# Patient Record
Sex: MALE | Race: WHITE | NOT HISPANIC OR LATINO | Employment: UNEMPLOYED | URBAN - METROPOLITAN AREA
[De-identification: names, ages, dates, MRNs, and addresses within clinical notes are randomized per-mention and may not be internally consistent; named-entity substitution may affect disease eponyms.]

---

## 2018-01-09 NOTE — MISCELLANEOUS
Message   Recorded as Task   Date: 08/05/2016 09:46 AM, Created By: KELLY [facility]   Task Name: Follow Up   Assigned To: [facility] [place] [facility]   Regarding Patient: Gucci Shelby, Status: In Progress   Comment:    Robert Long - 05 Aug 2016 9:46 AM     TASK CREATED  Caller: Self; General Medical Question; [phone] (Home)  Message left w/ ans svc 8/4/2016 @ 1810  "Please call regarding rx at pharmacy needs auth to be refilled "   Robert Long - 05 Aug 2016 10:49 AM     TASK EDITED  LMOM to cb   Robert Long - 05 Aug 2016 12:00 PM     TASK EDITED  S/w pt, confirmed nucynta ER is requiring auth to be filled  Advised pt per DG, the medication on hand at the time of discharge was to be used to decrease and stop - Nucynta ER 1 pill po qd x 7 days then stop  Unfilled rx's will not be authorized  Pt argued that wasn't enough medication - advised that 7 pills were available  Pt stated that he will be reporting the office somehow  Asked the pt if there were any other questions or concerns  pt stated, "Bitch"  Call ended  Robert Long - 05 Aug 2016 12:05 PM     TASK EDITED  s/w [facility] [phone]  Advised pharmacist to destroy rx for nucynta  mg per DG  Pharmacist verbalized understnding and appreciation  DG aware  Active Problems    1  Cervical radiculopathy (723 4) (M54 12)   2  Chronic cervical pain (723 1,338 29) (M54 2,G89 29)   3  Difficulty sleeping (780 50) (G47 9)   4  Encounter for long-term opiate analgesic use (V58 69) (Z79 891)   5  Herniated nucleus pulposus, C4-5 (722 0) (M50 22)   6  Herniated nucleus pulposus, C6-7 left (722 0) (M50 22)   7  Hypertension (401 9) (I10)   8  Opioid dependence, continuous (304 01) (F11 20)   9  Other spondylosis, cervical region (721 0) (M47 892)   10  Pain syndrome, chronic (338 4) (G89 4)   11  Shoulder bursitis (726 10) (M75 50)   12  Stenosis, cervical spine (723 0) (M48 02)   13   Tendinitis of right shoulder (726 10) (M75 81)    Current Meds   1  Albuterol 90 MCG/ACT AERS; Therapy: (Recorded:04Mar2016) to Recorded   2  Gabapentin 300 MG Oral Capsule; Take 2 PO HS x 2 weeks, then 3 PO HS; Therapy: 54KZL8436 to (Evaluate:83Gbq0380)  Requested for: 21Jul2016; Last   Rx:76Mgm1372 Ordered   3  Nucynta  MG Oral Tablet Extended Release 12 Hour; Take 1 Pill Daily x 7 days,   then STOP; Therapy: 91EVD7823 to (Evaluate:10Aug2016); Last Rx:00Sse4453 Ordered   4  OxyCODONE HCl - 10 MG Oral Tablet; Take 1 pill BID X 5 days, then 1 pill QD x 5   days, then 1 pill every other day x 4 days, then STOP; Therapy: 47XFD9130 to (Evaluate:10Aug2016); Last Rx:73Lvz8411 Ordered   5  Symbicort AERO; Therapy: (Recorded:04Mar2016) to Recorded   6  Zolpidem Tartrate 10 MG Oral Tablet; TAKE 1 TABLET AT BEDTIME AS NEEDED   Recorded    Allergies    1  No Known Drug Allergies    2   Latex    Signatures   Electronically signed by : Danial Castillo, ; Aug  5 2016 12:05PM EST                       (Author)

## 2018-01-10 NOTE — MISCELLANEOUS
Message   Recorded as Task   Date: 05/24/2016 11:09 AM, Created By: Kathryn Dickerson   Task Name: Miscellaneous   Assigned To: [facility] [place] [facility]   Regarding Patient: Estela Contreras, Status: In Progress   CommentMariana Prim - 24 May 2016 11:09 AM     TASK CREATED  Patient left a message on the answering machine that he is finding it is difficult to work  He was wondering if you would fill out short term disability papers for him  He would like a call back at [phone]  Thank you  Mitesh Harper - 24 May 2016 11:12 AM     TASK REPLIED TO: Previously Assigned To [facility] [place] [facility]  Please advise him that I do not determine disability and that he would need to f/u with his PCP  We will give our notes or documentation  The only other option would be for him to see Dr Starr Drummond for his work restrictions and determine if he can or can not work  Let me know if he wants to see Dr Starr Drummond for that assessment  Robert Long - 24 May 2016 11:28 AM     TASK EDITED  *** FYI ***   S/w pt, advised of kong  pt verbalized understanding  States, if this office receives any paperwork from [employer], please discard  Mitesh Harper - 24 May 2016 11:36 AM     TASK REPLIED TO: Previously Assigned To Mitesh Harper  Provider aware  Thank you  Active Problems    1  Cervical radiculopathy (723 4) (M54 12)   2  Chronic cervical pain (723 1,338 29) (M54 2,G89 29)   3  Difficulty sleeping (780 50) (G47 9)   4  Encounter for long-term opiate analgesic use (V58 69) (Z79 891)   5  Herniated nucleus pulposus, C4-5 (722 0) (M50 22)   6  Herniated nucleus pulposus, C6-7 left (722 0) (M50 22)   7  Hypertension (401 9) (I10)   8  Opioid dependence, continuous (304 01) (F11 20)   9  Other spondylosis, cervical region (721 0) (M47 892)   10  Pain syndrome, chronic (338 4) (G89 4)   11  Shoulder bursitis (726 10) (M75 50)   12  Stenosis, cervical spine (723 0) (M48 02)   13   Tendinitis of right shoulder (725 10) (O21 81)    Current Meds   1  Albuterol 90 MCG/ACT AERS; Therapy: (Recorded:04Mar2016) to Recorded   2  Gabapentin 300 MG Oral Capsule; Take 2 PO HS x 2 weeks, then 3 PO HS; Therapy: 32PCO2621 to (Evaluate:22Jul2016)  Requested for: 76IHZ1613; Last   Rx:23May2016 Ordered   3  Nucynta  MG Oral Tablet Extended Release 12 Hour; Take 1 tablet twice daily; Therapy: 74UPW4219 to (Evaluate:22Jun2016); Last Rx:23May2016 Ordered   4  OxyCODONE HCl - 10 MG Oral Tablet; TAKE 1 TABLET EVERY 6 HOURS AS NEEDED; Therapy: 73HFT5015 to (Evaluate:22Jun2016); Last Rx:23May2016 Ordered   5  Symbicort AERO; Therapy: (Recorded:04Mar2016) to Recorded   6  Zolpidem Tartrate 10 MG Oral Tablet; TAKE 1 TABLET AT BEDTIME AS NEEDED   Recorded    Allergies    1  No Known Drug Allergies    2   Latex    Signatures   Electronically signed by : Theodora Dandy, ; May 24 2016 12:49PM EST                       (Author)

## 2018-01-11 NOTE — MISCELLANEOUS
Message   Recorded as Task   Date: 02/02/2016 10:55 AM, Created By: Adelia Rankin   Task Name: Miscellaneous   Assigned To: Adelia Rankin   Regarding Patient: Grey Corral, Status: Active   CommentBeatrice Duverney - 02 Feb 2016 10:55 AM     TASK CREATED  Patient missed appt  2/2/16 @ 10:45 AM with Dr Stella Jin  I called the patient and left a message on his answering machine to call the office to reschedule this appt  I put a sticky note in IDX to schedule the patient with Mitesh if he calls to reschedule  Lebron Mcgill - 02 Feb 2016 11:02 AM     TASK REPLIED TO: Previously Assigned To Lebron Mcgill  aware-please let pcp know     Signatures   Electronically signed by :  Berna Guevara, ; Feb 2 2016 11:55AM EST                       (Author)    Electronically signed by : David Hudson DO; Feb 2 2016 12:15PM EST

## 2018-01-12 NOTE — MISCELLANEOUS
Message   Recorded as Task   Date: 07/21/2016 09:13 AM, Created By: Marcin Robison   Task Name: Med Renewal Request   Assigned To: [facility] [place] [facility]   Regarding Patient: Dana Hein, Status: In Progress   Comment:    Robert Long - 21 Jul 2016 9:13 AM     TASK CREATED  Caller: Self; Renew Medication; [phone] (Home)  S/w pt, states he will need refills before his ov in august  Per pt, Gabapentin 300 mg 1 tab po tid, nucynta er bid and oxycodone QD  Pt w/ difficulty remembering his medication - hydrocodone vs oxycodone  "Percocet is the one w/ oxycodone in it, right?"  Pt states he will run out of medication today / tomorrow  Requesting rx's until his august ov  States, "then I'll give a urine and it'll be clean "  Advised pt - controlled substances require an ov  Will d/w DG and Mitesh Mitchell - 21 Jul 2016 9:58 AM     TASK REPLIED TO: Previously Assigned To [facility] [place] [facility]  He will need to come in for an OV today or tomorrow to provide a UDS and the point of care results would need to be appropriate before I can provide any scripts  Robert Long - 21 Jul 2016 10:05 AM     TASK EDITED  LMOM to cb   Robert Long - 21 Jul 2016 1:56 PM     TASK EDITED  [facility] 7/21/2016 @ 1307 -   Pt states he will be home for 60-90 min  S/w pt, advised of above  pt verbalized understanding  States he will be in today before 4  DG aware        Active Problems    1  Cervical radiculopathy (723 4) (M54 12)   2  Chronic cervical pain (723 1,338 29) (M54 2,G89 29)   3  Difficulty sleeping (780 50) (G47 9)   4  Encounter for long-term opiate analgesic use (V58 69) (Z79 891)   5  Herniated nucleus pulposus, C4-5 (722 0) (M50 22)   6  Herniated nucleus pulposus, C6-7 left (722 0) (M50 22)   7  Hypertension (401 9) (I10)   8  Opioid dependence, continuous (304 01) (F11 20)   9  Other spondylosis, cervical region (721 0) (M47 892)   10  Pain syndrome, chronic (338 4) (G89 4)   11   Shoulder bursitis (726 10) (M75 50)   12  Stenosis, cervical spine (723 0) (M48 02)   13  Tendinitis of right shoulder (726 10) (M75 81)    Current Meds   1  Albuterol 90 MCG/ACT AERS; Therapy: (Recorded:04Mar2016) to Recorded   2  Gabapentin 300 MG Oral Capsule; Take 2 PO HS x 2 weeks, then 3 PO HS; Therapy: 76WOY7308 to (Evaluate:22Jul2016)  Requested for: 31GAL9941; Last   Rx:23May2016 Ordered   3  Nucynta  MG Oral Tablet Extended Release 12 Hour; Take 1 tablet twice daily; Therapy: 66JZF2860 to (Evaluate:22Jun2016); Last Rx:23May2016 Ordered   4  OxyCODONE HCl - 10 MG Oral Tablet; TAKE 1 TABLET EVERY 6 HOURS AS NEEDED; Therapy: 62GLV3440 to (Evaluate:22Jun2016); Last Rx:23May2016 Ordered   5  Symbicort AERO; Therapy: (Recorded:04Mar2016) to Recorded   6  Zolpidem Tartrate 10 MG Oral Tablet; TAKE 1 TABLET AT BEDTIME AS NEEDED   Recorded    Allergies    1  No Known Drug Allergies    2   Latex    Signatures   Electronically signed by : Eloisa Ahuja, ; Jul 21 2016  1:58PM EST                       (Author)

## 2018-01-13 NOTE — MISCELLANEOUS
Message   Recorded as Task   Date: 07/29/2016 06:33 AM, Created By: [employer]   Task Name: Follow Up   Assigned To: Chungruben Nietoill   Regarding Patient: Dom Ledezma, Status: Active   CommentRenee Bernard - 29 Jul 2016 6:33 AM     TASK CREATED  Can you please cancel his upcoming OV and put a note in IDX that he is discharged from our practice  Thank you  Elvis Cat - 01 Aug 2016 8:54 AM     TASK EDITED  NOTE POSTED AND APPT CXED  Active Problems    1  Cervical radiculopathy (723 4) (M54 12)   2  Chronic cervical pain (723 1,338 29) (M54 2,G89 29)   3  Difficulty sleeping (780 50) (G47 9)   4  Encounter for long-term opiate analgesic use (V58 69) (Z79 891)   5  Herniated nucleus pulposus, C4-5 (722 0) (M50 22)   6  Herniated nucleus pulposus, C6-7 left (722 0) (M50 22)   7  Hypertension (401 9) (I10)   8  Opioid dependence, continuous (304 01) (F11 20)   9  Other spondylosis, cervical region (721 0) (M47 892)   10  Pain syndrome, chronic (338 4) (G89 4)   11  Shoulder bursitis (726 10) (M75 50)   12  Stenosis, cervical spine (723 0) (M48 02)   13  Tendinitis of right shoulder (726 10) (M75 81)    Current Meds   1  Albuterol 90 MCG/ACT AERS; Therapy: (Recorded:04Mar2016) to Recorded   2  Gabapentin 300 MG Oral Capsule; Take 2 PO HS x 2 weeks, then 3 PO HS; Therapy: 61ZLE1028 to (Evaluate:73Hca6065)  Requested for: 21Jul2016; Last   Rx:82Gxi2168 Ordered   3  Nucynta  MG Oral Tablet Extended Release 12 Hour; Take 1 Pill Daily x 7 days,   then STOP; Therapy: 89ZXP1290 to (Evaluate:10Aug2016); Last Rx:36Uxo9045 Ordered   4  OxyCODONE HCl - 10 MG Oral Tablet; Take 1 pill BID X 5 days, then 1 pill QD x 5   days, then 1 pill every other day x 4 days, then STOP; Therapy: 82KLW9530 to (Evaluate:10Aug2016); Last Rx:74Gqz3303 Ordered   5  Symbicort AERO; Therapy: (Recorded:04Mar2016) to Recorded   6   Zolpidem Tartrate 10 MG Oral Tablet; TAKE 1 TABLET AT BEDTIME AS NEEDED Recorded    Allergies    1  No Known Drug Allergies    2  Latex    Signatures   Electronically signed by :  Snow Castañeda, ; Aug  1 2016  8:54AM EST                       (Author)

## 2018-01-13 NOTE — RESULT NOTES
Message   Recorded as Task   Date: 07/26/2016 12:28 PM, Created By: Yudy Higgins   Task Name: Follow Up   Assigned To: [facility] [place] [facility]   Regarding Patient: Radha Madera, Status: In Progress   Comment:    Mitesh Harper - 26 Jul 2016 12:28 PM     TASK CREATED  Can you please call the patient and advise him that his UDS results came back and confirmed that his UDS was positive for Methadone, but negative for Oxycodone, which he reported taking and is prescribed  The Methadone is not prescribed to him  Can he explain? Robert Long - 26 Jul 2016 3:23 PM     TASK EDITED  LMOM to cb on home #   MercedesRobert - 26 Jul 2016 3:24 PM     TASK EDITED  [facility] to cb on cell   MercedesManfarrukh - 26 Jul 2016 3:34 PM     TASK EDITED  s/w pt, advised of above  Pt states he was at his mom's house anywhere from 4 -10 days ago  "Maybe the 18th? 19th?"  Pt states he did not have his medciations with him because he keeps them locked  Pt states he took 2 of his brother's pills  Pt states his brother told him they were oxycodone, but his brother does have a h/o heroin use, "so maybe they were methadone "  Pt states he cannot explain why there was no oxycodone in his system  States his last dose was on the 19th  Advised pt, will d/w DG and cb  Pt verbalized understanding  Mitesh Harper - 26 Jul 2016 4:32 PM     TASK REPLIED TO: Previously Assigned To Mitesh Harper  The issues is that I flat out asked him when he was in the office regarding the Methadone  He did not discuss taking someone else's medication at that point  This is a direct violation of the opioid contract and we have no choice but to discharge him from the practice  He is to decrease the Nucynta ER to 1 PO QD x 7 days, then STOP  He is to decrease the Oxycodone 10 mg to 1 PO BID x 5 days, then 1 PO QD x 5 days, then 1 PO QOD x 4 days, then STOP  We will cancel his upcoming OV and send an official discharge letter     Robert Long - 27 Jul 2016 10:44 AM     TASK EDITED  LMOM to cb on home and cell   Robert Long - 29 Jul 2016 1:31 PM     TASK EDITED  *** FYI ***  S/w pt, advised of above  Pt states he did not mention the methadone at his uds because he forgot  Pt states his wife reminded him during previous telephone call  Restated above  Pt verbalized understanding   Mitesh Harper - 29 Jul 2016 1:34 PM     TASK REPLIED TO: Previously Assigned To Ashok Noyola  Provider aware  Thank you          Signatures   Electronically signed by : You Apodaca, ; Jul 29 2016  2:26PM EST                       (Author)

## 2018-01-15 NOTE — RESULT NOTES
Message   Recorded as Task   Date: 02/22/2016 08:34 AM, Created By: Johnathon Lee   Task Name: Follow Up   Assigned To: [facility] [facility]   Regarding Patient: Leslee Paula, Status: Active   Comment:    Trish Healy - 22 Feb 2016 8:34 AM     TASK CREATED  Pt  s/p NICOLE #1 on 2/18/16 w/ Dr Sheela Goode #2 scheduled on 3/3/16 at 145 pm w/ Dr Angelina Bernabe scheduled on 3/10/16 at 130 pm w/ DG    Please contact pt  on 2/25/16  Osman Sheets - 26 Feb 2016 12:56 PM     TASK EDITED  Spoke with pt who states that he had some improvement the first 18 hours post injection, but after that point he experienced no improvemnt  Pt reports his current pain to be a 9/10  Pt reports no s/s of infection or adverse effects post injection  Pt states that he will be in for his injection on 3/3, and that he will be going for his surgery consult on 3/4     Lebron Mcgill - 26 Feb 2016 1:57 PM     TASK REPLIED TO: Previously Assigned To Lebron Mcgill  aware

## 2018-01-16 NOTE — MISCELLANEOUS
Message   Recorded as Task   Date: 03/28/2016 12:22 PM, Created By: David Carter   Task Name: Follow Up   Assigned To: [facility] [place] [facility]   Regarding Patient: Nhi Hoover, Status: In Progress   Comment:    Mitesh Harper - 28 Mar 2016 12:22 PM     TASK CREATED  Please call the patient and advise him that I need to cancel his OV on Thursday because of a death in my family  Is he taking by the Nucynta ER and PRN Oxycodone? Is it helping? Any side effects? Let me know  He will need a f/u OV with me in 3-4 weeeks for 15 mins  Thank you  Sarah Aggarwal - 28 Mar 2016 1:46 PM     TASK EDITED  Attempted to call the pt  and LMOM to CB to reschedule  Sarah Aggarwal - 28 Mar 2016 1:46 PM     TASK IN PROGRESS   Sarah Aggarwal - 01 Apr 2016 8:32 AM     TASK EDITED  POVS on 3/29        Active Problems    1  Cervical radiculopathy (723 4) (M54 12)   2  Chronic cervical pain (723 1,338 29) (M54 2,G89 29)   3  Difficulty sleeping (780 50) (G47 9)   4  Encounter for long-term opiate analgesic use (V58 69) (Z79 891)   5  Herniated nucleus pulposus, C4-5 (722 0) (M50 22)   6  Herniated nucleus pulposus, C6-7 left (722 0) (M50 22)   7  Hypertension (401 9) (I10)   8  Opioid dependence, continuous (304 01) (F11 20)   9  Other spondylosis, cervical region (721 0) (M47 892)   10  Pain syndrome, chronic (338 4) (G89 4)   11  Shoulder bursitis (726 10) (M75 50)   12  Stenosis, cervical spine (723 0) (M48 02)   13  Tendinitis of right shoulder (726 10) (M75 81)    Current Meds   1  Albuterol 90 MCG/ACT AERS; Therapy: (Recorded:04Mar2016) to Recorded   2  Gabapentin 300 MG Oral Capsule; take 1 Po TID; Therapy: 15PXE0659 to (Latasha Romero)  Requested for: 94DRV4326; Last   Rx:69Hgw2736 Ordered   3  Nucynta  MG Oral Tablet Extended Release 12 Hour; Take 1 tablet twice daily; Therapy: 79ORG1695 to (Evaluate:28Apr2016); Last Rx:29Mar2016 Ordered   4  OxyCODONE HCl - 15 MG Oral Tablet;  Take 1 PO TID PRN for break through pain; Therapy: 20FCL5951 to (Evaluate:28Apr2016); Last Rx:29Mar2016 Ordered   5  Symbicort AERO; Therapy: (Recorded:04Mar2016) to Recorded   6  Zolpidem Tartrate 10 MG Oral Tablet; TAKE 1 TABLET AT BEDTIME AS NEEDED   Recorded    Allergies    1  No Known Drug Allergies    2   Latex    Signatures   Electronically signed by : Katharine Garibay, ; Apr 1 2016  8:32AM EST                       (Author)

## 2019-12-09 ENCOUNTER — TELEPHONE (OUTPATIENT)
Dept: UROLOGY | Facility: MEDICAL CENTER | Age: 49
End: 2019-12-09

## 2019-12-09 NOTE — TELEPHONE ENCOUNTER
Please Triage - Er Follow Up Appt  [facility] - [facility]    What is the reason for the patients appointment? Retention/ BPH Catheter placed during ER Visit  Labs and imaging done ( records being faxed over)     Do we accept the patient's insurance or is the patient Self-Pay? [employer]  Member ID # [id number]       Has the patient had any previous urologist(s)? No     Has the patients records been requested? Yes, being faxed [phone]     Has the patient had any outside testing done? Yes, MRI     What is the patients location preference for an office visit? [facility], ok with seeing PA     Does the patient have a personal history of cancer?   No     Kyra Winchester can be reached at [phone]

## 2019-12-10 NOTE — TELEPHONE ENCOUNTER
Patient as new patient seen at [facility] 12/12  Is coming in to have catheter removed  Patient is currently residing in [facility], an In Patient facility in [facility]  Patient does not have access to any IDs  Is this a problem?   Please contact Jason Jerez at [phone]  Thank you

## 2019-12-12 ENCOUNTER — OFFICE VISIT (OUTPATIENT)
Dept: UROLOGY | Facility: CLINIC | Age: 49
End: 2019-12-12
Payer: COMMERCIAL

## 2019-12-12 VITALS
HEART RATE: 88 BPM | DIASTOLIC BLOOD PRESSURE: 70 MMHG | SYSTOLIC BLOOD PRESSURE: 120 MMHG | BODY MASS INDEX: 39.63 KG/M2 | WEIGHT: 308.64 LBS

## 2019-12-12 DIAGNOSIS — N40.1 BPH WITH OBSTRUCTION/LOWER URINARY TRACT SYMPTOMS: ICD-10-CM

## 2019-12-12 DIAGNOSIS — R33.9 URINARY RETENTION: Primary | ICD-10-CM

## 2019-12-12 DIAGNOSIS — N13.8 BPH WITH OBSTRUCTION/LOWER URINARY TRACT SYMPTOMS: ICD-10-CM

## 2019-12-12 DIAGNOSIS — Z87.442 HISTORY OF NEPHROLITHIASIS: ICD-10-CM

## 2019-12-12 PROCEDURE — 99204 OFFICE O/P NEW MOD 45 MIN: CPT | Performed by: UROLOGY

## 2019-12-12 RX ORDER — HYDROXYZINE PAMOATE 100 MG/1
100 CAPSULE ORAL 3 TIMES DAILY PRN
COMMUNITY

## 2019-12-12 RX ORDER — QUETIAPINE FUMARATE 100 MG/1
100 TABLET, FILM COATED ORAL
COMMUNITY

## 2019-12-12 RX ORDER — PRAZOSIN HYDROCHLORIDE 1 MG/1
1 CAPSULE ORAL
COMMUNITY

## 2019-12-12 RX ORDER — NORTRIPTYLINE HYDROCHLORIDE 25 MG/1
CAPSULE ORAL
COMMUNITY

## 2019-12-12 RX ORDER — HYDROXYZINE PAMOATE 50 MG/1
50 CAPSULE ORAL 3 TIMES DAILY PRN
COMMUNITY

## 2019-12-12 RX ORDER — MIRTAZAPINE 45 MG/1
45 TABLET, FILM COATED ORAL
COMMUNITY

## 2019-12-12 RX ORDER — DICLOFENAC POTASSIUM 50 MG/1
50 TABLET, FILM COATED ORAL 3 TIMES DAILY
COMMUNITY

## 2019-12-12 RX ORDER — ACETAMINOPHEN 500 MG
500 TABLET ORAL EVERY 6 HOURS PRN
COMMUNITY

## 2019-12-12 RX ORDER — TAMSULOSIN HYDROCHLORIDE 0.4 MG/1
0.4 CAPSULE ORAL EVERY EVENING
Qty: 30 CAPSULE | Refills: 0 | Status: SHIPPED | OUTPATIENT
Start: 2019-12-12

## 2019-12-12 RX ORDER — CALCIUM CARBONATE 200(500)MG
1 TABLET,CHEWABLE ORAL DAILY
COMMUNITY

## 2019-12-12 NOTE — PROGRESS NOTES
[address] [facility]  [facility]  [address] [facility] [place]  [place], [address] [facility]  [phone]  www  [facility]  org      NAME: Usha Mcdaniel  AGE: 54 y o  SEX: male  : 1970   MRN: [id number]    DATE: 2019  TIME: 1:25 PM    Assessment and Plan:    Urinary retention, possibly due to some the medications he is on and perhaps BPH  We will remove the Yee catheter day, start him on Flomax and see him back in 6 weeks with a kidney ultrasound with PVR to check on both the report of this possible stone and make sure he is emptying properly  Chief Complaint   No chief complaint on file  History of Present Illness   New patient office visit:  17-year-old man without previous urologic history has urinary retention with Yee catheter placed 4 days ago  He had been urinating frequently prior to this but has no history of urinary retention in the past   He was told when he was incarcerated recently that he had a kidney stone possibly ureteral stone but he has not passed this  No history of UTI  He does take hydroxyzine 100 mg p o  Q h s, along with nortriptyline and mirtazapine  He does not take any narcotics  He had a scrotal ultrasound done for bilateral scrotal pain recently which showed basically normal testicles but he had a 7 mm solid lesion on the head of the caput epididymis  We will reimage this in the future  He also reportedly had a CT scan of the abdomen and pelvis done at [facility]   He is in a rehab facility near [facility]   He does have remote history of renal calculi besides this    The following portions of the patient's history were reviewed and updated as appropriate: allergies, current medications, past family history, past medical history, past social history, past surgical history and problem list   Past Medical History:   Diagnosis Date    Kidney stone      Past Surgical History:   Procedure Laterality Date    ANKLE SURGERY  07/2006    HERNIA REPAIR      ROTATOR CUFF REPAIR  02/2010     shoulder  Review of Systems   Review of Systems   Constitutional:        185 lb weight loss, intentional   Gastrointestinal: Negative  Genitourinary: Positive for difficulty urinating  Active Problem List   There is no problem list on file for this patient  Objective   /70   Pulse 88   Wt (!) 140 kg (308 lb 10 3 oz)   BMI 39 63 kg/m²     Physical Exam   Constitutional: He is oriented to person, place, and time  He appears well-developed and well-nourished  HENT:   Head: Normocephalic and atraumatic  Eyes: EOM are normal    Neck: Normal range of motion  Pulmonary/Chest: Effort normal    Abdominal: Soft  He exhibits no distension and no mass  There is no tenderness  There is no rebound and no guarding  No hernia  Genitourinary: Rectum normal, prostate normal and penis normal    Genitourinary Comments: Normal circumcised phallus, mild coronal hypospadias  Testicles are descended bilaterally and are within normal limits  Yee catheter is in place draining clear yellow urine  No palpable abnormalities  Rectal exam reveals normal tone, no masses and his prostate is about 40 g, smooth symmetric and benign  Musculoskeletal: Normal range of motion  Neurological: He is alert and oriented to person, place, and time  Skin: Skin is warm and dry  Psychiatric: He has a normal mood and affect   His behavior is normal  Judgment and thought content normal            Current Medications     Current Outpatient Medications:     acetaminophen (TYLENOL) 500 mg tablet, Take 500 mg by mouth every 6 (six) hours as needed for mild pain, Disp: , Rfl:     Albuterol Sulfate (VENTOLIN HFA IN), Inhale, Disp: , Rfl:     calcium carbonate (TUMS) 500 mg chewable tablet, Chew 1 tablet daily, Disp: , Rfl:     diclofenac potassium (CATAFLAM) 50 mg tablet, Take 50 mg by mouth 3 (three) times a day, Disp: , Rfl:    fluticasone-salmeterol (ADVAIR HFA) 115-21 MCG/ACT inhaler, Inhale 2 puffs 2 (two) times a day Rinse mouth after use , Disp: , Rfl:     hydrOXYzine (VISTARIL) 100 MG capsule, Take 100 mg by mouth 3 (three) times a day as needed for itching, Disp: , Rfl:     hydrOXYzine pamoate (VISTARIL) 50 mg capsule, Take 50 mg by mouth 3 (three) times a day as needed for itching, Disp: , Rfl:     mirtazapine (REMERON) 45 MG tablet, Take 45 mg by mouth daily at bedtime, Disp: , Rfl:     nortriptyline (PAMELOR) 25 mg capsule, Take by mouth daily at bedtime, Disp: , Rfl:     prazosin (MINIPRESS) 1 mg capsule, Take 1 mg by mouth daily at bedtime, Disp: , Rfl:     QUEtiapine (SEROquel) 100 mg tablet, Take 100 mg by mouth daily at bedtime, Disp: , Rfl:     multivitamin (THERAGRAN) TABS, Take 1 tablet by mouth daily, Disp: , Rfl:         Madan Anderson MD

## 2019-12-12 NOTE — LETTER
2019     Yessenia Guzman, [address] [facility] [place] [address]    Patient: Sanaz Mina   YOB: 1970   Date of Visit: 2019       Dear Dr Sun Hodge: Thank you for referring Rae Xiomaracristy to me for evaluation  Below are my notes for this consultation  If you have questions, please do not hesitate to call me  I look forward to following your patient along with you  Sincerely,        Saniya Kevin MD        CC: No Recipients  Saniya Kevin MD  2019  1:26 PM  Sign at close encounter  [address] [facility]  [address], [address] [facility]  [phone]  www  [facility]  org      NAME: Caryn Mcdaniel  AGE: 54 y o  SEX: male  : 1970   MRN: [id number]    DATE: 2019  TIME: 1:25 PM    Assessment and Plan:    Urinary retention, possibly due to some the medications he is on and perhaps BPH  We will remove the Yee catheter day, start him on Flomax and see him back in 6 weeks with a kidney ultrasound with PVR to check on both the report of this possible stone and make sure he is emptying properly  Chief Complaint   No chief complaint on file  History of Present Illness   New patient office visit:  51-year-old man without previous urologic history has urinary retention with Yee catheter placed 4 days ago  He had been urinating frequently prior to this but has no history of urinary retention in the past   He was told when he was incarcerated recently that he had a kidney stone possibly ureteral stone but he has not passed this  No history of UTI  He does take hydroxyzine 100 mg p o  Q h s, along with nortriptyline and mirtazapine  He does not take any narcotics  He had a scrotal ultrasound done for bilateral scrotal pain recently which showed basically normal testicles but he had a 7 mm solid lesion on the head of the caput epididymis    We will reimage this in the future  He also reportedly had a CT scan of the abdomen and pelvis done at [facility]   He is in a rehab facility near [facility]   He does have remote history of renal calculi besides this  The following portions of the patient's history were reviewed and updated as appropriate: allergies, current medications, past family history, past medical history, past social history, past surgical history and problem list   Past Medical History:   Diagnosis Date    Kidney stone      Past Surgical History:   Procedure Laterality Date    ANKLE SURGERY  07/2006    HERNIA REPAIR      ROTATOR CUFF REPAIR  02/2010     shoulder  Review of Systems   Review of Systems   Constitutional:        185 lb weight loss, intentional   Gastrointestinal: Negative  Genitourinary: Positive for difficulty urinating  Active Problem List   There is no problem list on file for this patient  Objective   /70   Pulse 88   Wt (!) 140 kg (308 lb 10 3 oz)   BMI 39 63 kg/m²      Physical Exam   Constitutional: He is oriented to person, place, and time  He appears well-developed and well-nourished  HENT:   Head: Normocephalic and atraumatic  Eyes: EOM are normal    Neck: Normal range of motion  Pulmonary/Chest: Effort normal    Abdominal: Soft  He exhibits no distension and no mass  There is no tenderness  There is no rebound and no guarding  No hernia  Genitourinary: Rectum normal, prostate normal and penis normal    Genitourinary Comments: Normal circumcised phallus, mild coronal hypospadias  Testicles are descended bilaterally and are within normal limits  Yee catheter is in place draining clear yellow urine  No palpable abnormalities  Rectal exam reveals normal tone, no masses and his prostate is about 40 g, smooth symmetric and benign  Musculoskeletal: Normal range of motion  Neurological: He is alert and oriented to person, place, and time  Skin: Skin is warm and dry     Psychiatric: He has a normal mood and affect   His behavior is normal  Judgment and thought content normal            Current Medications     Current Outpatient Medications:     acetaminophen (TYLENOL) 500 mg tablet, Take 500 mg by mouth every 6 (six) hours as needed for mild pain, Disp: , Rfl:     Albuterol Sulfate (VENTOLIN HFA IN), Inhale, Disp: , Rfl:     calcium carbonate (TUMS) 500 mg chewable tablet, Chew 1 tablet daily, Disp: , Rfl:     diclofenac potassium (CATAFLAM) 50 mg tablet, Take 50 mg by mouth 3 (three) times a day, Disp: , Rfl:     fluticasone-salmeterol (ADVAIR HFA) 115-21 MCG/ACT inhaler, Inhale 2 puffs 2 (two) times a day Rinse mouth after use , Disp: , Rfl:     hydrOXYzine (VISTARIL) 100 MG capsule, Take 100 mg by mouth 3 (three) times a day as needed for itching, Disp: , Rfl:     hydrOXYzine pamoate (VISTARIL) 50 mg capsule, Take 50 mg by mouth 3 (three) times a day as needed for itching, Disp: , Rfl:     mirtazapine (REMERON) 45 MG tablet, Take 45 mg by mouth daily at bedtime, Disp: , Rfl:     nortriptyline (PAMELOR) 25 mg capsule, Take by mouth daily at bedtime, Disp: , Rfl:     prazosin (MINIPRESS) 1 mg capsule, Take 1 mg by mouth daily at bedtime, Disp: , Rfl:     QUEtiapine (SEROquel) 100 mg tablet, Take 100 mg by mouth daily at bedtime, Disp: , Rfl:     multivitamin (THERAGRAN) TABS, Take 1 tablet by mouth daily, Disp: , Rfl:         Marcio Coyne MD

## 2019-12-13 NOTE — PROGRESS NOTES
Procedures  Patient returns for Yee removal  Yee removed without difficulty, patient tolerated procedure well  Urine draining  Denies fever or urinary symptoms  Patient instructed to increase fluids and limit caffeine intake  To return to office if unable to void within 4-6 hours, or has increased discomfort

## 2021-09-20 ENCOUNTER — BMR PREADMISSION ASSESSMENT (OUTPATIENT)
Dept: ADMISSIONS | Facility: REHABILITATION | Age: 51
End: 2021-09-20

## 2025-02-22 ENCOUNTER — HOSPITAL ENCOUNTER (EMERGENCY)
Facility: HOSPITAL | Age: 55
End: 2025-02-22

## 2025-02-22 ENCOUNTER — APPOINTMENT (EMERGENCY)
Dept: RADIOLOGY | Facility: HOSPITAL | Age: 55
DRG: 057 | End: 2025-02-22
Payer: COMMERCIAL

## 2025-02-22 ENCOUNTER — HOSPITAL ENCOUNTER (INPATIENT)
Facility: HOSPITAL | Age: 55
LOS: 4 days | Discharge: HOME HEALTH | DRG: 057 | End: 2025-02-26
Attending: EMERGENCY MEDICINE | Admitting: INTERNAL MEDICINE
Payer: COMMERCIAL

## 2025-02-22 ENCOUNTER — APPOINTMENT (INPATIENT)
Dept: RADIOLOGY | Facility: HOSPITAL | Age: 55
DRG: 057 | End: 2025-02-22
Attending: NURSE PRACTITIONER
Payer: COMMERCIAL

## 2025-02-22 DIAGNOSIS — I63.9 CEREBROVASCULAR ACCIDENT (CVA), UNSPECIFIED MECHANISM (CMS/HCC): Primary | ICD-10-CM

## 2025-02-22 PROBLEM — R53.1 LEFT-SIDED WEAKNESS: Status: ACTIVE | Noted: 2025-02-22

## 2025-02-22 LAB
ABO + RH BLD: NORMAL
ANION GAP SERPL CALC-SCNC: 5 MEQ/L (ref 3–15)
APTT PPP: 30 SEC (ref 23–35)
BASOPHILS # BLD: 0.06 K/UL (ref 0.01–0.1)
BASOPHILS NFR BLD: 0.6 %
BLD GP AB SCN SERPL QL: NEGATIVE
BUN SERPL-MCNC: 20 MG/DL (ref 7–25)
CALCIUM SERPL-MCNC: 9.6 MG/DL (ref 8.6–10.3)
CHLORIDE SERPL-SCNC: 103 MEQ/L (ref 98–107)
CO2 SERPL-SCNC: 29 MEQ/L (ref 21–31)
CREAT SERPL-MCNC: 1.1 MG/DL (ref 0.7–1.3)
D AG BLD QL: POSITIVE
DIFFERENTIAL METHOD BLD: ABNORMAL
EGFRCR SERPLBLD CKD-EPI 2021: >60 ML/MIN/1.73M*2
EOSINOPHIL # BLD: 0.64 K/UL (ref 0.04–0.54)
EOSINOPHIL NFR BLD: 6.9 %
ERYTHROCYTE [DISTWIDTH] IN BLOOD BY AUTOMATED COUNT: 12.4 % (ref 11.6–14.4)
GLUCOSE BLD-MCNC: 74 MG/DL (ref 70–99)
GLUCOSE BLD-MCNC: 81 MG/DL (ref 70–99)
GLUCOSE SERPL-MCNC: 87 MG/DL (ref 70–99)
HCT VFR BLD AUTO: 48.9 % (ref 40.1–51)
HGB BLD-MCNC: 16.8 G/DL (ref 13.7–17.5)
IMM GRANULOCYTES # BLD AUTO: 0.05 K/UL (ref 0–0.08)
IMM GRANULOCYTES NFR BLD AUTO: 0.5 %
INR PPP: 1
LABORATORY COMMENT REPORT: NORMAL
LYMPHOCYTES # BLD: 2.01 K/UL (ref 1.2–3.5)
LYMPHOCYTES NFR BLD: 21.7 %
MCH RBC QN AUTO: 31.8 PG (ref 28–33.2)
MCHC RBC AUTO-ENTMCNC: 34.4 G/DL (ref 32.2–36.5)
MCV RBC AUTO: 92.6 FL (ref 83–98)
MONOCYTES # BLD: 0.63 K/UL (ref 0.3–1)
MONOCYTES NFR BLD: 6.8 %
MRSA DNA SPEC QL NAA+PROBE: NEGATIVE
NEUTROPHILS # BLD: 5.88 K/UL (ref 1.7–7)
NEUTS SEG NFR BLD: 63.5 %
NRBC BLD-RTO: 0 %
PLATELET # BLD AUTO: 196 K/UL (ref 150–350)
PMV BLD AUTO: 10.5 FL (ref 9.4–12.4)
POCT TEST: NORMAL
POCT TEST: NORMAL
POTASSIUM SERPL-SCNC: 4.2 MEQ/L (ref 3.5–5.1)
PROTHROMBIN TIME: 13 SEC (ref 12.2–14.5)
RBC # BLD AUTO: 5.28 M/UL (ref 4.5–5.8)
SODIUM SERPL-SCNC: 137 MEQ/L (ref 136–145)
SPECIMEN EXP DATE BLD: NORMAL
TROPONIN I SERPL HS-MCNC: 2.4 PG/ML
TROPONIN I SERPL HS-MCNC: 2.5 PG/ML
WBC # BLD AUTO: 9.27 K/UL (ref 3.8–10.5)

## 2025-02-22 PROCEDURE — 93005 ELECTROCARDIOGRAM TRACING: CPT | Performed by: NURSE PRACTITIONER

## 2025-02-22 PROCEDURE — 63600000 HC DRUGS/DETAIL CODE: Mod: TB | Performed by: BEHAVIOR TECHNICIAN

## 2025-02-22 PROCEDURE — 93005 ELECTROCARDIOGRAM TRACING: CPT | Performed by: BEHAVIOR TECHNICIAN

## 2025-02-22 PROCEDURE — 85730 THROMBOPLASTIN TIME PARTIAL: CPT | Performed by: BEHAVIOR TECHNICIAN

## 2025-02-22 PROCEDURE — 70450 CT HEAD/BRAIN W/O DYE: CPT

## 2025-02-22 PROCEDURE — 99291 CRITICAL CARE FIRST HOUR: CPT | Performed by: INTERNAL MEDICINE

## 2025-02-22 PROCEDURE — 70496 CT ANGIOGRAPHY HEAD: CPT

## 2025-02-22 PROCEDURE — 96374 THER/PROPH/DIAG INJ IV PUSH: CPT | Mod: 59

## 2025-02-22 PROCEDURE — 80048 BASIC METABOLIC PNL TOTAL CA: CPT | Performed by: BEHAVIOR TECHNICIAN

## 2025-02-22 PROCEDURE — 99285 EMERGENCY DEPT VISIT HI MDM: CPT | Mod: 25

## 2025-02-22 PROCEDURE — 20000000 HC ROOM AND CARE ICU

## 2025-02-22 PROCEDURE — 3E03317 INTRODUCTION OF OTHER THROMBOLYTIC INTO PERIPHERAL VEIN, PERCUTANEOUS APPROACH: ICD-10-PCS | Performed by: INTERNAL MEDICINE

## 2025-02-22 PROCEDURE — 87641 MR-STAPH DNA AMP PROBE: CPT | Performed by: INTERNAL MEDICINE

## 2025-02-22 PROCEDURE — 84484 ASSAY OF TROPONIN QUANT: CPT | Performed by: BEHAVIOR TECHNICIAN

## 2025-02-22 PROCEDURE — 86901 BLOOD TYPING SEROLOGIC RH(D): CPT

## 2025-02-22 PROCEDURE — 36415 COLL VENOUS BLD VENIPUNCTURE: CPT | Performed by: BEHAVIOR TECHNICIAN

## 2025-02-22 PROCEDURE — 85025 COMPLETE CBC W/AUTO DIFF WBC: CPT | Performed by: BEHAVIOR TECHNICIAN

## 2025-02-22 PROCEDURE — 85610 PROTHROMBIN TIME: CPT | Performed by: BEHAVIOR TECHNICIAN

## 2025-02-22 PROCEDURE — 63600105 HC IODINE BASED CONTRAST: Mod: JZ | Performed by: BEHAVIOR TECHNICIAN

## 2025-02-22 RX ORDER — DEXTROSE 40 %
15-30 GEL (GRAM) ORAL AS NEEDED
Status: DISCONTINUED | OUTPATIENT
Start: 2025-02-22 | End: 2025-02-26 | Stop reason: HOSPADM

## 2025-02-22 RX ORDER — EPINEPHRINE 1 MG/ML
0.3 INJECTION, SOLUTION INTRAMUSCULAR; SUBCUTANEOUS
Status: DISCONTINUED | OUTPATIENT
Start: 2025-02-22 | End: 2025-02-23

## 2025-02-22 RX ORDER — FAMOTIDINE 10 MG/ML
20 INJECTION, SOLUTION INTRAVENOUS ONCE AS NEEDED
Status: DISCONTINUED | OUTPATIENT
Start: 2025-02-22 | End: 2025-02-23

## 2025-02-22 RX ORDER — IBUPROFEN 200 MG
16-32 TABLET ORAL AS NEEDED
Status: DISCONTINUED | OUTPATIENT
Start: 2025-02-22 | End: 2025-02-26 | Stop reason: HOSPADM

## 2025-02-22 RX ORDER — DIPHENHYDRAMINE HCL 50 MG/ML
50 VIAL (ML) INJECTION ONCE AS NEEDED
Status: DISCONTINUED | OUTPATIENT
Start: 2025-02-22 | End: 2025-02-23

## 2025-02-22 RX ORDER — DEXTROSE 50 % IN WATER (D50W) INTRAVENOUS SYRINGE
25 AS NEEDED
Status: DISCONTINUED | OUTPATIENT
Start: 2025-02-22 | End: 2025-02-26 | Stop reason: HOSPADM

## 2025-02-22 RX ORDER — IOPAMIDOL 755 MG/ML
100 INJECTION, SOLUTION INTRAVASCULAR
Status: COMPLETED | OUTPATIENT
Start: 2025-02-22 | End: 2025-02-22

## 2025-02-22 RX ADMIN — Medication 25 MG: at 12:24

## 2025-02-22 RX ADMIN — IOPAMIDOL 100 ML: 755 INJECTION, SOLUTION INTRAVENOUS at 12:17

## 2025-02-23 ENCOUNTER — APPOINTMENT (INPATIENT)
Dept: RADIOLOGY | Facility: HOSPITAL | Age: 55
DRG: 057 | End: 2025-02-23
Attending: NURSE PRACTITIONER
Payer: COMMERCIAL

## 2025-02-23 LAB
ANION GAP SERPL CALC-SCNC: 7 MEQ/L (ref 3–15)
ATRIAL RATE: 71
ATRIAL RATE: 71
BUN SERPL-MCNC: 19 MG/DL (ref 7–25)
CALCIUM SERPL-MCNC: 7.3 MG/DL (ref 8.6–10.3)
CHLORIDE SERPL-SCNC: 111 MEQ/L (ref 98–107)
CHOLEST SERPL-MCNC: 118 MG/DL
CO2 SERPL-SCNC: 23 MEQ/L (ref 21–31)
CREAT SERPL-MCNC: 0.8 MG/DL (ref 0.7–1.3)
EGFRCR SERPLBLD CKD-EPI 2021: >60 ML/MIN/1.73M*2
ERYTHROCYTE [DISTWIDTH] IN BLOOD BY AUTOMATED COUNT: 12.5 % (ref 11.6–14.4)
EST. AVERAGE GLUCOSE BLD GHB EST-MCNC: 97 MG/DL
GLUCOSE SERPL-MCNC: 61 MG/DL (ref 70–99)
HBA1C MFR BLD: 5 %
HCT VFR BLD AUTO: 49 % (ref 40.1–51)
HDLC SERPL-MCNC: 32 MG/DL
HDLC SERPL: 3.7 {RATIO}
HGB BLD-MCNC: 16.5 G/DL (ref 13.7–17.5)
LDLC SERPL CALC-MCNC: 73 MG/DL
MCH RBC QN AUTO: 31.3 PG (ref 28–33.2)
MCHC RBC AUTO-ENTMCNC: 33.7 G/DL (ref 32.2–36.5)
MCV RBC AUTO: 93 FL (ref 83–98)
NONHDLC SERPL-MCNC: 86 MG/DL
P AXIS: 50
P AXIS: 52
PLATELET # BLD AUTO: 162 K/UL (ref 150–350)
PMV BLD AUTO: 10.5 FL (ref 9.4–12.4)
POTASSIUM SERPL-SCNC: 4 MEQ/L (ref 3.5–5.1)
PR INTERVAL: 150
PR INTERVAL: 158
QRS DURATION: 82
QRS DURATION: 82
QT INTERVAL: 380
QT INTERVAL: 398
QTC CALCULATION(BAZETT): 412
QTC CALCULATION(BAZETT): 432
R AXIS: 27
R AXIS: 56
RBC # BLD AUTO: 5.27 M/UL (ref 4.5–5.8)
SODIUM SERPL-SCNC: 141 MEQ/L (ref 136–145)
T WAVE AXIS: 21
T WAVE AXIS: 41
TRIGL SERPL-MCNC: 63 MG/DL
VENTRICULAR RATE: 71
VENTRICULAR RATE: 71
WBC # BLD AUTO: 9.03 K/UL (ref 3.8–10.5)

## 2025-02-23 PROCEDURE — 36415 COLL VENOUS BLD VENIPUNCTURE: CPT | Performed by: NURSE PRACTITIONER

## 2025-02-23 PROCEDURE — 70551 MRI BRAIN STEM W/O DYE: CPT

## 2025-02-23 PROCEDURE — 63600000 HC DRUGS/DETAIL CODE: Mod: JZ | Performed by: NURSE PRACTITIONER

## 2025-02-23 PROCEDURE — 20000000 HC ROOM AND CARE ICU

## 2025-02-23 PROCEDURE — 80061 LIPID PANEL: CPT | Performed by: NURSE PRACTITIONER

## 2025-02-23 PROCEDURE — 92610 EVALUATE SWALLOWING FUNCTION: CPT | Mod: GN

## 2025-02-23 PROCEDURE — 83036 HEMOGLOBIN GLYCOSYLATED A1C: CPT | Performed by: NURSE PRACTITIONER

## 2025-02-23 PROCEDURE — 85027 COMPLETE CBC AUTOMATED: CPT | Performed by: NURSE PRACTITIONER

## 2025-02-23 PROCEDURE — 80048 BASIC METABOLIC PNL TOTAL CA: CPT | Performed by: NURSE PRACTITIONER

## 2025-02-23 PROCEDURE — 63700000 HC SELF-ADMINISTRABLE DRUG: Performed by: NURSE PRACTITIONER

## 2025-02-23 RX ORDER — LORAZEPAM 2 MG/ML
0.5 INJECTION INTRAMUSCULAR ONCE
Status: COMPLETED | OUTPATIENT
Start: 2025-02-23 | End: 2025-02-23

## 2025-02-23 RX ORDER — ASPIRIN 81 MG/1
81 TABLET ORAL DAILY
Status: DISCONTINUED | OUTPATIENT
Start: 2025-02-23 | End: 2025-02-26 | Stop reason: HOSPADM

## 2025-02-23 RX ORDER — ATORVASTATIN CALCIUM 40 MG/1
40 TABLET, FILM COATED ORAL DAILY
Status: DISCONTINUED | OUTPATIENT
Start: 2025-02-23 | End: 2025-02-26 | Stop reason: HOSPADM

## 2025-02-23 RX ADMIN — LORAZEPAM 0.5 MG: 2 INJECTION INTRAMUSCULAR; INTRAVENOUS at 10:45

## 2025-02-23 RX ADMIN — ATORVASTATIN CALCIUM 40 MG: 40 TABLET, FILM COATED ORAL at 13:38

## 2025-02-23 RX ADMIN — ASPIRIN 81 MG: 81 TABLET, COATED ORAL at 13:38

## 2025-02-24 ENCOUNTER — APPOINTMENT (INPATIENT)
Dept: CARDIOLOGY | Facility: HOSPITAL | Age: 55
DRG: 057 | End: 2025-02-24
Attending: NURSE PRACTITIONER
Payer: COMMERCIAL

## 2025-02-24 PROBLEM — F44.7 FUNCTIONAL NEUROLOGICAL SYMPTOM DISORDER WITH MIXED SYMPTOMS: Status: ACTIVE | Noted: 2025-02-24

## 2025-02-24 PROBLEM — H40.20X0 PRIMARY ANGLE-CLOSURE GLAUCOMA: Status: ACTIVE | Noted: 2025-02-24

## 2025-02-24 PROBLEM — F54 PSYCHOLOGICAL FACTORS AFFECTING MEDICAL CONDITION: Status: ACTIVE | Noted: 2025-02-24

## 2025-02-24 PROBLEM — R53.1 LEFT-SIDED WEAKNESS: Status: ACTIVE | Noted: 2025-02-24

## 2025-02-24 LAB
ANION GAP SERPL CALC-SCNC: 5 MEQ/L (ref 3–15)
AORTIC ROOT ANNULUS: 3.2 CM
AORTIC VALVE MEAN VELOCITY: 0.77 M/S
AORTIC VALVE VELOCITY TIME INTEGRAL: 21.9 CM
ASCENDING AORTA: 3 CM
AV MEAN GRADIENT: 3 MMHG
AV PEAK GRADIENT: 5 MMHG
AV PEAK VELOCITY-S: 1.14 M/S
AV VALVE AREA: 2.43 CM2
AV VELOCITY RATIO: 0.79
AVA (VTI): 2.74 CM2
BUN SERPL-MCNC: 24 MG/DL (ref 7–25)
CALCIUM SERPL-MCNC: 9.4 MG/DL (ref 8.6–10.3)
CHLORIDE SERPL-SCNC: 101 MEQ/L (ref 98–107)
CO2 SERPL-SCNC: 31 MEQ/L (ref 21–31)
CREAT SERPL-MCNC: 1.1 MG/DL (ref 0.7–1.3)
DOP CALC LVOT STROKE VOLUME: 59.89 CM3
E WAVE DECELERATION TIME: 388 MS
E/A RATIO: 0.8
E/E' RATIO: 5.2
E/LAT E' RATIO: 4.1
EDV (BP): 79.9 CM3
EF (A4C): 61.9 %
EF A2C: 62.2 %
EGFRCR SERPLBLD CKD-EPI 2021: >60 ML/MIN/1.73M*2
EJECTION FRACTION: 61.7 %
ERYTHROCYTE [DISTWIDTH] IN BLOOD BY AUTOMATED COUNT: 12.3 % (ref 11.6–14.4)
ESV (BP): 30.6 CM3
FRACTIONAL SHORTENING: 27.39 %
GLUCOSE SERPL-MCNC: 108 MG/DL (ref 70–99)
HCT VFR BLD AUTO: 48.8 % (ref 40.1–51)
HGB BLD-MCNC: 16.4 G/DL (ref 13.7–17.5)
INTERVENTRICULAR SEPTUM: 1.15 CM
LA/AORTA RATIO: 0.91
LAAS-AP2: 15.8 CM2
LAAS-AP4: 22.7 CM2
LAD 2D: 2.9 CM
LALD A4C: 4.94 CM
LALD A4C: 7.05 CM
LAV-S: 40.5 CM3
LEFT ATRIUM VOLUME: 58.8 CM3
LEFT INTERNAL DIMENSION IN SYSTOLE: 2.89 CM
LEFT VENTRICLE DIASTOLIC VOLUME: 83.3 CM3
LEFT VENTRICLE SYSTOLIC VOLUME: 31.7 CM3
LEFT VENTRICULAR INTERNAL DIMENSION IN DIASTOLE: 3.98 CM
LEFT VENTRICULAR POSTERIOR WALL IN END DIASTOLE: 1.06 CM
LV DIASTOLIC VOLUME: 74.7 CM3
LV ESV (APICAL 2 CHAMBER): 28.2 CM3
LVAD-AP2: 28.8 CM2
LVAD-AP4: 29.9 CM2
LVAS-AP2: 15.1 CM2
LVAS-AP4: 15.8 CM2
LVLD-AP2: 9.27 CM
LVLD-AP4: 9.01 CM
LVLS-AP2: 7.21 CM
LVLS-AP4: 6.85 CM
LVOT 2D: 2.1 CM
LVOT A: 3.46 CM2
LVOT MG: 2 MMHG
LVOT MV: 0.68 M/S
LVOT PEAK VELOCITY: 1.02 M/S
LVOT PG: 4 MMHG
LVOT VTI: 17.3 CM
MCH RBC QN AUTO: 31.3 PG (ref 28–33.2)
MCHC RBC AUTO-ENTMCNC: 33.6 G/DL (ref 32.2–36.5)
MCV RBC AUTO: 93.1 FL (ref 83–98)
MV E'TISSUE VEL-LAT: 0.11 M/S
MV E'TISSUE VEL-MED: 0.09 M/S
MV PEAK A VEL: 0.59 M/S
MV PEAK E VEL: 0.46 M/S
MV STENOSIS PRESSURE HALF TIME: 114 MS
MV VALVE AREA P 1/2 METHOD: 1.93 CM2
PLATELET # BLD AUTO: 192 K/UL (ref 150–350)
PMV BLD AUTO: 10.6 FL (ref 9.4–12.4)
POSTERIOR WALL: 1.06 CM
POTASSIUM SERPL-SCNC: 5.2 MEQ/L (ref 3.5–5.1)
RBC # BLD AUTO: 5.24 M/UL (ref 4.5–5.8)
SEPTAL TISSUE DOPPLER FREE WALL LATE DIA VELOCITY (APICAL 4 CHAMBER VIEW): 0.15 M/S
SODIUM SERPL-SCNC: 137 MEQ/L (ref 136–145)
TAPSE: 2.09 CM
WBC # BLD AUTO: 8.79 K/UL (ref 3.8–10.5)

## 2025-02-24 PROCEDURE — 90792 PSYCH DIAG EVAL W/MED SRVCS: CPT

## 2025-02-24 PROCEDURE — 63700000 HC SELF-ADMINISTRABLE DRUG: Performed by: NURSE PRACTITIONER

## 2025-02-24 PROCEDURE — 93306 TTE W/DOPPLER COMPLETE: CPT | Mod: 26 | Performed by: STUDENT IN AN ORGANIZED HEALTH CARE EDUCATION/TRAINING PROGRAM

## 2025-02-24 PROCEDURE — 80048 BASIC METABOLIC PNL TOTAL CA: CPT

## 2025-02-24 PROCEDURE — 85027 COMPLETE CBC AUTOMATED: CPT

## 2025-02-24 PROCEDURE — 93306 TTE W/DOPPLER COMPLETE: CPT

## 2025-02-24 PROCEDURE — 63700000 HC SELF-ADMINISTRABLE DRUG

## 2025-02-24 PROCEDURE — 97166 OT EVAL MOD COMPLEX 45 MIN: CPT | Mod: GO

## 2025-02-24 PROCEDURE — 99233 SBSQ HOSP IP/OBS HIGH 50: CPT | Performed by: NURSE PRACTITIONER

## 2025-02-24 PROCEDURE — 36415 COLL VENOUS BLD VENIPUNCTURE: CPT

## 2025-02-24 PROCEDURE — 99233 SBSQ HOSP IP/OBS HIGH 50: CPT | Performed by: HOSPITALIST

## 2025-02-24 PROCEDURE — 97162 PT EVAL MOD COMPLEX 30 MIN: CPT | Mod: GP

## 2025-02-24 PROCEDURE — 63700000 HC SELF-ADMINISTRABLE DRUG: Performed by: HOSPITALIST

## 2025-02-24 PROCEDURE — 12000000 HC ROOM AND CARE MED/SURG

## 2025-02-24 RX ORDER — ACETAMINOPHEN 325 MG/1
650 TABLET ORAL EVERY 6 HOURS PRN
Status: DISCONTINUED | OUTPATIENT
Start: 2025-02-24 | End: 2025-02-26 | Stop reason: HOSPADM

## 2025-02-24 RX ORDER — DORZOLAMIDE HYDROCHLORIDE AND TIMOLOL MALEATE PRESERVATIVE FREE 20; 5 MG/ML; MG/ML
1 SOLUTION/ DROPS OPHTHALMIC EVERY 12 HOURS
Status: DISCONTINUED | OUTPATIENT
Start: 2025-02-24 | End: 2025-02-26 | Stop reason: HOSPADM

## 2025-02-24 RX ADMIN — ACETAMINOPHEN 650 MG: 325 TABLET, FILM COATED ORAL at 22:18

## 2025-02-24 RX ADMIN — ATORVASTATIN CALCIUM 40 MG: 40 TABLET, FILM COATED ORAL at 09:29

## 2025-02-24 RX ADMIN — ASPIRIN 81 MG: 81 TABLET, COATED ORAL at 09:29

## 2025-02-24 RX ADMIN — DORZOLAMIDE HYDROCHLORIDE AND TIMOLOL MALEATE PRESERVATIVE FREE 1 DROP: 20; 5 SOLUTION/ DROPS OPHTHALMIC at 21:36

## 2025-02-25 ENCOUNTER — APPOINTMENT (INPATIENT)
Dept: RADIOLOGY | Facility: HOSPITAL | Age: 55
DRG: 057 | End: 2025-02-25
Attending: STUDENT IN AN ORGANIZED HEALTH CARE EDUCATION/TRAINING PROGRAM
Payer: COMMERCIAL

## 2025-02-25 ENCOUNTER — APPOINTMENT (INPATIENT)
Dept: RADIOLOGY | Facility: HOSPITAL | Age: 55
DRG: 057 | End: 2025-02-25
Payer: COMMERCIAL

## 2025-02-25 LAB
25(OH)D3 SERPL-MCNC: 8 NG/ML (ref 30–100)
ALBUMIN SERPL-MCNC: 3.1 G/DL (ref 3.5–5.7)
ALP SERPL-CCNC: 59 IU/L (ref 34–125)
ALT SERPL-CCNC: 43 IU/L (ref 7–52)
ANION GAP SERPL CALC-SCNC: 3 MEQ/L (ref 3–15)
AST SERPL-CCNC: 20 IU/L (ref 13–39)
BILIRUB SERPL-MCNC: 0.6 MG/DL (ref 0.3–1.2)
BUN SERPL-MCNC: 17 MG/DL (ref 7–25)
CALCIUM SERPL-MCNC: 7 MG/DL (ref 8.6–10.3)
CHLORIDE SERPL-SCNC: 111 MEQ/L (ref 98–107)
CO2 SERPL-SCNC: 26 MEQ/L (ref 21–31)
CREAT SERPL-MCNC: 0.7 MG/DL (ref 0.7–1.3)
EGFRCR SERPLBLD CKD-EPI 2021: >60 ML/MIN/1.73M*2
ERYTHROCYTE [DISTWIDTH] IN BLOOD BY AUTOMATED COUNT: 12.4 % (ref 11.6–14.4)
GLUCOSE BLD-MCNC: 114 MG/DL (ref 70–99)
GLUCOSE BLD-MCNC: 155 MG/DL (ref 70–99)
GLUCOSE BLD-MCNC: 95 MG/DL (ref 70–99)
GLUCOSE SERPL-MCNC: 79 MG/DL (ref 70–99)
HCT VFR BLD AUTO: 43.4 % (ref 40.1–51)
HGB BLD-MCNC: 14.7 G/DL (ref 13.7–17.5)
MCH RBC QN AUTO: 31.6 PG (ref 28–33.2)
MCHC RBC AUTO-ENTMCNC: 33.9 G/DL (ref 32.2–36.5)
MCV RBC AUTO: 93.3 FL (ref 83–98)
PLATELET # BLD AUTO: 193 K/UL (ref 150–350)
PMV BLD AUTO: 10.4 FL (ref 9.4–12.4)
POCT TEST: ABNORMAL
POCT TEST: ABNORMAL
POCT TEST: NORMAL
POTASSIUM SERPL-SCNC: 3.5 MEQ/L (ref 3.5–5.1)
PROT SERPL-MCNC: 5 G/DL (ref 6–8.2)
RBC # BLD AUTO: 4.65 M/UL (ref 4.5–5.8)
SODIUM SERPL-SCNC: 140 MEQ/L (ref 136–145)
WBC # BLD AUTO: 7.89 K/UL (ref 3.8–10.5)

## 2025-02-25 PROCEDURE — 36415 COLL VENOUS BLD VENIPUNCTURE: CPT | Performed by: HOSPITALIST

## 2025-02-25 PROCEDURE — 63700000 HC SELF-ADMINISTRABLE DRUG: Performed by: HOSPITALIST

## 2025-02-25 PROCEDURE — 97535 SELF CARE MNGMENT TRAINING: CPT | Mod: GO

## 2025-02-25 PROCEDURE — 63700000 HC SELF-ADMINISTRABLE DRUG: Performed by: NURSE PRACTITIONER

## 2025-02-25 PROCEDURE — 82306 VITAMIN D 25 HYDROXY: CPT | Performed by: HOSPITALIST

## 2025-02-25 PROCEDURE — 73030 X-RAY EXAM OF SHOULDER: CPT | Mod: LT

## 2025-02-25 PROCEDURE — 72125 CT NECK SPINE W/O DYE: CPT

## 2025-02-25 PROCEDURE — 99232 SBSQ HOSP IP/OBS MODERATE 35: CPT | Performed by: HOSPITALIST

## 2025-02-25 PROCEDURE — 85027 COMPLETE CBC AUTOMATED: CPT | Performed by: HOSPITALIST

## 2025-02-25 PROCEDURE — 12000000 HC ROOM AND CARE MED/SURG

## 2025-02-25 PROCEDURE — 80053 COMPREHEN METABOLIC PANEL: CPT | Performed by: HOSPITALIST

## 2025-02-25 PROCEDURE — 70450 CT HEAD/BRAIN W/O DYE: CPT

## 2025-02-25 RX ADMIN — ATORVASTATIN CALCIUM 40 MG: 40 TABLET, FILM COATED ORAL at 10:20

## 2025-02-25 RX ADMIN — DORZOLAMIDE HYDROCHLORIDE AND TIMOLOL MALEATE PRESERVATIVE FREE 1 DROP: 20; 5 SOLUTION/ DROPS OPHTHALMIC at 20:44

## 2025-02-25 RX ADMIN — ASPIRIN 81 MG: 81 TABLET, COATED ORAL at 10:20

## 2025-02-25 RX ADMIN — DORZOLAMIDE HYDROCHLORIDE AND TIMOLOL MALEATE PRESERVATIVE FREE 1 DROP: 20; 5 SOLUTION/ DROPS OPHTHALMIC at 10:20

## 2025-02-26 VITALS
DIASTOLIC BLOOD PRESSURE: 57 MMHG | TEMPERATURE: 98.9 F | HEART RATE: 66 BPM | SYSTOLIC BLOOD PRESSURE: 122 MMHG | WEIGHT: 315 LBS | RESPIRATION RATE: 18 BRPM | OXYGEN SATURATION: 98 %

## 2025-02-26 PROCEDURE — 63700000 HC SELF-ADMINISTRABLE DRUG: Performed by: NURSE PRACTITIONER

## 2025-02-26 PROCEDURE — 97530 THERAPEUTIC ACTIVITIES: CPT | Mod: GO

## 2025-02-26 PROCEDURE — 99238 HOSP IP/OBS DSCHRG MGMT 30/<: CPT | Performed by: HOSPITALIST

## 2025-02-26 PROCEDURE — 97116 GAIT TRAINING THERAPY: CPT | Mod: GP

## 2025-02-26 PROCEDURE — 63700000 HC SELF-ADMINISTRABLE DRUG: Performed by: HOSPITALIST

## 2025-02-26 RX ORDER — DORZOLAMIDE HYDROCHLORIDE AND TIMOLOL MALEATE PRESERVATIVE FREE 20; 5 MG/ML; MG/ML
1 SOLUTION/ DROPS OPHTHALMIC EVERY 12 HOURS
Qty: 3 EACH | Refills: 0 | Status: SHIPPED | OUTPATIENT
Start: 2025-02-26 | End: 2025-03-28

## 2025-02-26 RX ORDER — CHOLECALCIFEROL (VITAMIN D3) 50 MCG
2000 TABLET ORAL DAILY
Qty: 30 TABLET | Refills: 0 | Status: SHIPPED | OUTPATIENT
Start: 2025-02-26 | End: 2025-03-28

## 2025-02-26 RX ORDER — ASPIRIN 81 MG/1
81 TABLET ORAL DAILY
Qty: 30 TABLET | Refills: 0 | Status: SHIPPED | OUTPATIENT
Start: 2025-02-26 | End: 2025-03-28

## 2025-02-26 RX ORDER — ATORVASTATIN CALCIUM 40 MG/1
40 TABLET, FILM COATED ORAL DAILY
Qty: 30 TABLET | Refills: 0 | Status: SHIPPED | OUTPATIENT
Start: 2025-02-26 | End: 2025-03-28

## 2025-02-26 RX ADMIN — DORZOLAMIDE HYDROCHLORIDE AND TIMOLOL MALEATE PRESERVATIVE FREE 1 DROP: 20; 5 SOLUTION/ DROPS OPHTHALMIC at 10:53

## 2025-02-26 RX ADMIN — ATORVASTATIN CALCIUM 40 MG: 40 TABLET, FILM COATED ORAL at 10:51

## 2025-02-26 RX ADMIN — ASPIRIN 81 MG: 81 TABLET, COATED ORAL at 10:51

## 2025-05-24 ENCOUNTER — APPOINTMENT (EMERGENCY)
Dept: RADIOLOGY | Facility: HOSPITAL | Age: 55
End: 2025-05-24
Attending: EMERGENCY MEDICINE
Payer: MEDICARE

## 2025-05-24 ENCOUNTER — HOSPITAL ENCOUNTER (EMERGENCY)
Facility: HOSPITAL | Age: 55
Discharge: HOME | End: 2025-05-24
Attending: EMERGENCY MEDICINE
Payer: MEDICARE

## 2025-05-24 VITALS
DIASTOLIC BLOOD PRESSURE: 65 MMHG | OXYGEN SATURATION: 97 % | WEIGHT: 315 LBS | RESPIRATION RATE: 16 BRPM | HEART RATE: 56 BPM | TEMPERATURE: 97.8 F | SYSTOLIC BLOOD PRESSURE: 121 MMHG | HEIGHT: 71 IN | BODY MASS INDEX: 44.1 KG/M2

## 2025-05-24 DIAGNOSIS — R53.1 WEAKNESS: ICD-10-CM

## 2025-05-24 DIAGNOSIS — N39.0 LOWER URINARY TRACT INFECTION: ICD-10-CM
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
## 2025-05-24 PROBLEM — H54.3 BLINDNESS OF BOTH EYES: Status: ACTIVE | Noted: 2025-05-24

## 2025-05-24 PROBLEM — F44.9 CONVERSION DISORDER: Status: ACTIVE | Noted: 2025-05-24

## 2025-05-24 PROBLEM — R82.81 PYURIA: Status: ACTIVE | Noted: 2025-05-24

## 2025-05-24 PROBLEM — F39 MOOD DISORDER (CMS/HCC): Status: ACTIVE | Noted: 2025-05-24

## 2025-05-24 LAB
ABO + RH BLD: NORMAL
ABO + RH BLD: NORMAL
ANION GAP SERPL CALC-SCNC: 4 MEQ/L (ref 3–15)
APTT PPP: 31 SEC (ref 23–35)
ATRIAL RATE: 58
BACTERIA URNS QL MICRO: ABNORMAL /HPF
BASOPHILS # BLD: 0.06 K/UL (ref 0.01–0.1)
BASOPHILS NFR BLD: 0.7 %
BILIRUB UR QL STRIP.AUTO: NEGATIVE MG/DL
BLD GP AB SCN SERPL QL: NEGATIVE
BUN SERPL-MCNC: 17 MG/DL (ref 7–25)
CALCIUM SERPL-MCNC: 9.6 MG/DL (ref 8.6–10.3)
CHLORIDE SERPL-SCNC: 104 MEQ/L (ref 98–107)
CLARITY UR REFRACT.AUTO: ABNORMAL
CO2 SERPL-SCNC: 30 MEQ/L (ref 21–31)
COLOR UR AUTO: YELLOW
CREAT SERPL-MCNC: 1.2 MG/DL (ref 0.7–1.3)
D AG BLD QL: POSITIVE
D AG BLD QL: POSITIVE
DIFFERENTIAL METHOD BLD: NORMAL
EGFRCR SERPLBLD CKD-EPI 2021: 46.5 ML/MIN/1.73M*2
EOSINOPHIL # BLD: 0.45 K/UL (ref 0.04–0.54)
EOSINOPHIL NFR BLD: 5.3 %
ERYTHROCYTE [DISTWIDTH] IN BLOOD BY AUTOMATED COUNT: 12.4 % (ref 11.6–14.4)
GLUCOSE BLD-MCNC: 96 MG/DL (ref 70–99)
GLUCOSE SERPL-MCNC: 100 MG/DL (ref 70–99)
GLUCOSE UR STRIP.AUTO-MCNC: NEGATIVE MG/DL
HCT VFR BLD AUTO: 47.1 % (ref 40.1–51)
HGB BLD-MCNC: 15.6 G/DL (ref 13.7–17.5)
HGB UR QL STRIP.AUTO: ABNORMAL
HYALINE CASTS #/AREA URNS LPF: ABNORMAL /LPF
IMM GRANULOCYTES # BLD AUTO: 0.03 K/UL (ref 0–0.08)
IMM GRANULOCYTES NFR BLD AUTO: 0.4 %
INR PPP: 1
KETONES UR STRIP.AUTO-MCNC: NEGATIVE MG/DL
LABORATORY COMMENT REPORT: NORMAL
LEUKOCYTE ESTERASE UR QL STRIP.AUTO: 3
LYMPHOCYTES # BLD: 2.42 K/UL (ref 1.2–3.5)
LYMPHOCYTES NFR BLD: 28.2 %
MCH RBC QN AUTO: 31.1 PG (ref 28–33.2)
MCHC RBC AUTO-ENTMCNC: 33.1 G/DL (ref 32.2–36.5)
MCV RBC AUTO: 94 FL (ref 83–98)
MONOCYTES # BLD: 0.68 K/UL (ref 0.3–1)
MONOCYTES NFR BLD: 7.9 %
MUCOUS THREADS URNS QL MICRO: ABNORMAL /LPF
NEUTROPHILS # BLD: 4.93 K/UL (ref 1.7–7)
NEUTS SEG NFR BLD: 57.5 %
NITRITE UR QL STRIP.AUTO: POSITIVE
NRBC BLD-RTO: 0 %
P AXIS: 8
PH UR STRIP.AUTO: 6.5 [PH]
PLATELET # BLD AUTO: 156 K/UL (ref 150–350)
PMV BLD AUTO: 11.1 FL (ref 9.4–12.4)
POCT TEST: NORMAL
POTASSIUM SERPL-SCNC: 4.7 MEQ/L (ref 3.5–5.1)
PR INTERVAL: 170
PROT UR QL STRIP.AUTO: NEGATIVE
PROTHROMBIN TIME: 13.3 SEC (ref 12.2–14.5)
QRS DURATION: 84
QT INTERVAL: 406
QTC CALCULATION(BAZETT): 398
R AXIS: 43
RBC # BLD AUTO: 5.01 M/UL (ref 4.5–5.8)
RBC #/AREA URNS HPF: ABNORMAL /HPF
SODIUM SERPL-SCNC: 138 MEQ/L (ref 136–145)
SP GR UR REFRACT.AUTO: >1.035
SPECIMEN EXP DATE BLD: NORMAL
SQUAMOUS URNS QL MICRO: ABNORMAL /HPF
T WAVE AXIS: 16
TROPONIN I SERPL HS-MCNC: 3.4 PG/ML
TROPONIN I SERPL HS-MCNC: 3.6 PG/ML
UROBILINOGEN UR STRIP-ACNC: 0.2 EU/DL
VENTRICULAR RATE: 58
WBC # BLD AUTO: 8.57 K/UL (ref 3.8–10.5)
WBC #/AREA URNS HPF: ABNORMAL /HPF

## 2025-05-24 PROCEDURE — 84484 ASSAY OF TROPONIN QUANT: CPT | Mod: 91 | Performed by: EMERGENCY MEDICINE

## 2025-05-24 PROCEDURE — 36415 COLL VENOUS BLD VENIPUNCTURE: CPT | Performed by: EMERGENCY MEDICINE

## 2025-05-24 PROCEDURE — 86900 BLOOD TYPING SEROLOGIC ABO: CPT

## 2025-05-24 PROCEDURE — 87186 SC STD MICRODIL/AGAR DIL: CPT | Performed by: EMERGENCY MEDICINE

## 2025-05-24 PROCEDURE — 70496 CT ANGIOGRAPHY HEAD: CPT

## 2025-05-24 PROCEDURE — 81001 URINALYSIS AUTO W/SCOPE: CPT | Performed by: EMERGENCY MEDICINE

## 2025-05-24 PROCEDURE — 85610 PROTHROMBIN TIME: CPT | Performed by: EMERGENCY MEDICINE

## 2025-05-24 PROCEDURE — 85730 THROMBOPLASTIN TIME PARTIAL: CPT | Performed by: EMERGENCY MEDICINE

## 2025-05-24 PROCEDURE — 63600000 HC DRUGS/DETAIL CODE: Mod: JZ | Performed by: EMERGENCY MEDICINE

## 2025-05-24 PROCEDURE — 93005 ELECTROCARDIOGRAM TRACING: CPT | Performed by: EMERGENCY MEDICINE

## 2025-05-24 PROCEDURE — 99284 EMERGENCY DEPT VISIT MOD MDM: CPT | Mod: 25

## 2025-05-24 PROCEDURE — 86901 BLOOD TYPING SEROLOGIC RH(D): CPT

## 2025-05-24 PROCEDURE — 99233 SBSQ HOSP IP/OBS HIGH 50: CPT | Performed by: STUDENT IN AN ORGANIZED HEALTH CARE EDUCATION/TRAINING PROGRAM

## 2025-05-24 PROCEDURE — 85025 COMPLETE CBC W/AUTO DIFF WBC: CPT | Performed by: EMERGENCY MEDICINE

## 2025-05-24 PROCEDURE — 3E03329 INTRODUCTION OF OTHER ANTI-INFECTIVE INTO PERIPHERAL VEIN, PERCUTANEOUS APPROACH: ICD-10-PCS | Performed by: EMERGENCY MEDICINE

## 2025-05-24 PROCEDURE — 70498 CT ANGIOGRAPHY NECK: CPT

## 2025-05-24 PROCEDURE — 96374 THER/PROPH/DIAG INJ IV PUSH: CPT | Mod: 59

## 2025-05-24 PROCEDURE — 84484 ASSAY OF TROPONIN QUANT: CPT | Performed by: EMERGENCY MEDICINE

## 2025-05-24 PROCEDURE — 70450 CT HEAD/BRAIN W/O DYE: CPT

## 2025-05-24 PROCEDURE — 80048 BASIC METABOLIC PNL TOTAL CA: CPT | Performed by: EMERGENCY MEDICINE

## 2025-05-24 PROCEDURE — 63600105 HC IODINE BASED CONTRAST: Mod: JZ | Performed by: EMERGENCY MEDICINE

## 2025-05-24 PROCEDURE — 87077 CULTURE AEROBIC IDENTIFY: CPT | Performed by: EMERGENCY MEDICINE

## 2025-05-24 PROCEDURE — 25800000 HC PHARMACY IV SOLUTIONS: Performed by: EMERGENCY MEDICINE

## 2025-05-24 RX ORDER — NOREPINEPHRINE BITARTRATE/D5W 4MG/250ML
PLASTIC BAG, INJECTION (ML) INTRAVENOUS
Status: DISCONTINUED
Start: 2025-05-24 | End: 2025-05-24 | Stop reason: HOSPADM

## 2025-05-24 RX ORDER — ATORVASTATIN CALCIUM 40 MG/1
40 TABLET, FILM COATED ORAL DAILY
Status: DISCONTINUED | OUTPATIENT
Start: 2025-05-24 | End: 2025-05-24 | Stop reason: HOSPADM

## 2025-05-24 RX ORDER — DORZOLAMIDE HYDROCHLORIDE AND TIMOLOL MALEATE PRESERVATIVE FREE 20; 5 MG/ML; MG/ML
1 SOLUTION/ DROPS OPHTHALMIC EVERY 12 HOURS
Status: DISCONTINUED | OUTPATIENT
Start: 2025-05-24 | End: 2025-05-24 | Stop reason: HOSPADM

## 2025-05-24 RX ORDER — ASPIRIN 81 MG/1
81 TABLET ORAL DAILY
Status: DISCONTINUED | OUTPATIENT
Start: 2025-05-24 | End: 2025-05-24 | Stop reason: HOSPADM

## 2025-05-24 RX ORDER — TAMSULOSIN HYDROCHLORIDE 0.4 MG/1
0.4 CAPSULE ORAL DAILY
Status: DISCONTINUED | OUTPATIENT
Start: 2025-05-24 | End: 2025-05-24 | Stop reason: HOSPADM

## 2025-05-24 RX ORDER — CEFUROXIME AXETIL 500 MG/1
500 TABLET ORAL 2 TIMES DAILY
Qty: 14 TABLET | Refills: 0 | Status: SHIPPED | OUTPATIENT
Start: 2025-05-24 | End: 2025-05-31

## 2025-05-24 RX ORDER — IOPAMIDOL 755 MG/ML
100 INJECTION, SOLUTION INTRAVASCULAR
Status: COMPLETED | OUTPATIENT
Start: 2025-05-24 | End: 2025-05-24

## 2025-05-24 RX ADMIN — CEFTRIAXONE SODIUM 1 G: 1 INJECTION, POWDER, FOR SOLUTION INTRAMUSCULAR; INTRAVENOUS at 09:02

## 2025-05-24 RX ADMIN — IOPAMIDOL 100 ML: 755 INJECTION, SOLUTION INTRAVENOUS at 03:41

## 2025-05-24 ASSESSMENT — COGNITIVE AND FUNCTIONAL STATUS - GENERAL
APPEARANCE: DISHEVELED
REMOTE MEMORY: WNL
THOUGHT_CONTENT: APPROPRIATE
PSYCHOMOTOR FUNCTIONING: WNL
APPETITE: NO CHANGE
AROUSAL LEVEL: ALERT
DELUSIONS: NONE OR AGE APPROPRIATE
THOUGHT_PROCESS: WNL
EYE_CONTACT: WNL
INSIGHT: INTACT
ORIENTATION: FULLY ORIENTED
ATTENTION: WNL
CONCENTRATION: WNL
IMPULSE CONTROL: INTACT
SPEECH: REGULAR
JUDGEMENT: INTACT
PERCEPTUAL FUNCTION: NORMAL
RECENT MEMORY: WNL
LIBIDO: DECREASED

## 2025-05-24 NOTE — PLAN OF CARE
SW met with pt at bedside as pt is not being admitted to the hospital at this time. Pt is medically cleared, asking to go home. Pt asking for a ride home.  Pt denying having insurance, [facility] setup. Pt understands pricing.
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
## 2025-05-24 NOTE — DISCHARGE INSTRUCTIONS
Outpatient Mental Health Resources     [facility]  [address], [place] [address] · [address]  [phone]    [facility]   [address], [place] · < 1 mi  [phone]     [facility] [place]  [id number]  [place] [address]  [phone]     [facility]  [address] [place] [address]  [phone]     [facility]  [address]  [place] [address]  [phone]

## 2025-05-25 ENCOUNTER — RESULTS FOLLOW-UP (OUTPATIENT)
Dept: EMERGENCY MEDICINE | Facility: HOSPITAL | Age: 55
End: 2025-05-25

## 2025-05-26 LAB
BACTERIA UR CULT: ABNORMAL
BACTERIA UR CULT: ABNORMAL

## 2025-06-23 ENCOUNTER — APPOINTMENT (EMERGENCY)
Dept: RADIOLOGY | Facility: HOSPITAL | Age: 55
End: 2025-06-23
Payer: COMMERCIAL

## 2025-06-23 ENCOUNTER — HOSPITAL ENCOUNTER (EMERGENCY)
Facility: HOSPITAL | Age: 55
Discharge: NURSING, REHAB OR LONG-TERM CARE | End: 2025-06-24
Attending: STUDENT IN AN ORGANIZED HEALTH CARE EDUCATION/TRAINING PROGRAM
Payer: COMMERCIAL
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
## 2025-06-23 DIAGNOSIS — R26.2 AMBULATORY DYSFUNCTION: Primary | ICD-10-CM

## 2025-06-23 LAB
ALBUMIN SERPL-MCNC: 3.9 G/DL (ref 3.5–5.7)
ALBUMIN SERPL-MCNC: 3.9 G/DL (ref 3.5–5.7)
ALP SERPL-CCNC: 55 IU/L (ref 34–125)
ALP SERPL-CCNC: 55 IU/L (ref 34–125)
ALT SERPL-CCNC: 47 IU/L (ref 7–52)
ALT SERPL-CCNC: 47 IU/L (ref 7–52)
AMPHET UR QL SCN: POSITIVE
AMPHET UR QL SCN: POSITIVE
ANION GAP SERPL CALC-SCNC: 7 MEQ/L (ref 3–15)
ANION GAP SERPL CALC-SCNC: 7 MEQ/L (ref 3–15)
APAP SERPL-MCNC: <0.1 UG/ML (ref 10–30)
APAP SERPL-MCNC: <0.1 UG/ML (ref 10–30)
AST SERPL-CCNC: 31 IU/L (ref 13–39)
AST SERPL-CCNC: 31 IU/L (ref 13–39)
BARBITURATES UR QL SCN: NOT DETECTED
BARBITURATES UR QL SCN: NOT DETECTED
BASOPHILS # BLD: 0.03 K/UL (ref 0.01–0.1)
BASOPHILS # BLD: 0.03 K/UL (ref 0.01–0.1)
BASOPHILS NFR BLD: 0.3 %
BASOPHILS NFR BLD: 0.3 %
BENZODIAZ UR QL SCN: NOT DETECTED
BENZODIAZ UR QL SCN: NOT DETECTED
BILIRUB SERPL-MCNC: 1.6 MG/DL (ref 0.3–1.2)
BILIRUB SERPL-MCNC: 1.6 MG/DL (ref 0.3–1.2)
BUN SERPL-MCNC: 16 MG/DL (ref 7–25)
BUN SERPL-MCNC: 16 MG/DL (ref 7–25)
CALCIUM SERPL-MCNC: 9.4 MG/DL (ref 8.6–10.3)
CALCIUM SERPL-MCNC: 9.4 MG/DL (ref 8.6–10.3)
CANNABINOIDS UR QL SCN: NOT DETECTED
CANNABINOIDS UR QL SCN: NOT DETECTED
CHLORIDE SERPL-SCNC: 105 MEQ/L (ref 98–107)
CHLORIDE SERPL-SCNC: 105 MEQ/L (ref 98–107)
CO2 SERPL-SCNC: 28 MEQ/L (ref 21–31)
CO2 SERPL-SCNC: 28 MEQ/L (ref 21–31)
COCAINE UR QL SCN: NOT DETECTED
COCAINE UR QL SCN: NOT DETECTED
CREAT SERPL-MCNC: 1.2 MG/DL (ref 0.7–1.3)
CREAT SERPL-MCNC: 1.2 MG/DL (ref 0.7–1.3)
DIFFERENTIAL METHOD BLD: ABNORMAL
DIFFERENTIAL METHOD BLD: ABNORMAL
EGFRCR SERPLBLD CKD-EPI 2021: 46.5 ML/MIN/1.73M*2
EGFRCR SERPLBLD CKD-EPI 2021: 46.5 ML/MIN/1.73M*2
EOSINOPHIL # BLD: 0.46 K/UL (ref 0.04–0.54)
EOSINOPHIL # BLD: 0.46 K/UL (ref 0.04–0.54)
EOSINOPHIL NFR BLD: 4.2 %
EOSINOPHIL NFR BLD: 4.2 %
ERYTHROCYTE [DISTWIDTH] IN BLOOD BY AUTOMATED COUNT: 12.5 % (ref 11.6–14.4)
ERYTHROCYTE [DISTWIDTH] IN BLOOD BY AUTOMATED COUNT: 12.5 % (ref 11.6–14.4)
ETHANOL SERPL-MCNC: <10 MG/DL
ETHANOL SERPL-MCNC: <10 MG/DL
FENTANYL CTO UR SCN-MCNC: POSITIVE NG/ML
FENTANYL CTO UR SCN-MCNC: POSITIVE NG/ML
GLUCOSE SERPL-MCNC: 95 MG/DL (ref 70–99)
GLUCOSE SERPL-MCNC: 95 MG/DL (ref 70–99)
HCT VFR BLD AUTO: 42.5 % (ref 40.1–51)
HCT VFR BLD AUTO: 42.5 % (ref 40.1–51)
HGB BLD-MCNC: 13.8 G/DL (ref 13.7–17.5)
HGB BLD-MCNC: 13.8 G/DL (ref 13.7–17.5)
IMM GRANULOCYTES # BLD AUTO: 0.06 K/UL (ref 0–0.08)
IMM GRANULOCYTES # BLD AUTO: 0.06 K/UL (ref 0–0.08)
IMM GRANULOCYTES NFR BLD AUTO: 0.5 %
IMM GRANULOCYTES NFR BLD AUTO: 0.5 %
LYMPHOCYTES # BLD: 1.74 K/UL (ref 1.2–3.5)
LYMPHOCYTES # BLD: 1.74 K/UL (ref 1.2–3.5)
LYMPHOCYTES NFR BLD: 15.8 %
LYMPHOCYTES NFR BLD: 15.8 %
MCH RBC QN AUTO: 30.9 PG (ref 28–33.2)
MCH RBC QN AUTO: 30.9 PG (ref 28–33.2)
MCHC RBC AUTO-ENTMCNC: 32.5 G/DL (ref 32.2–36.5)
MCHC RBC AUTO-ENTMCNC: 32.5 G/DL (ref 32.2–36.5)
MCV RBC AUTO: 95.1 FL (ref 83–98)
MCV RBC AUTO: 95.1 FL (ref 83–98)
MONOCYTES # BLD: 0.72 K/UL (ref 0.3–1)
MONOCYTES # BLD: 0.72 K/UL (ref 0.3–1)
MONOCYTES NFR BLD: 6.6 %
MONOCYTES NFR BLD: 6.6 %
NEUTROPHILS # BLD: 7.97 K/UL (ref 1.7–7)
NEUTROPHILS # BLD: 7.97 K/UL (ref 1.7–7)
NEUTS SEG NFR BLD: 72.6 %
NEUTS SEG NFR BLD: 72.6 %
NRBC BLD-RTO: 0 %
NRBC BLD-RTO: 0 %
OPIATES UR QL SCN: NOT DETECTED
OPIATES UR QL SCN: NOT DETECTED
PCP UR QL SCN: POSITIVE
PCP UR QL SCN: POSITIVE
PLATELET # BLD AUTO: 162 K/UL (ref 150–350)
PLATELET # BLD AUTO: 162 K/UL (ref 150–350)
PMV BLD AUTO: 10.9 FL (ref 9.4–12.4)
PMV BLD AUTO: 10.9 FL (ref 9.4–12.4)
POTASSIUM SERPL-SCNC: 3.8 MEQ/L (ref 3.5–5.1)
POTASSIUM SERPL-SCNC: 3.8 MEQ/L (ref 3.5–5.1)
PROT SERPL-MCNC: 6.6 G/DL (ref 6–8.2)
PROT SERPL-MCNC: 6.6 G/DL (ref 6–8.2)
RBC # BLD AUTO: 4.47 M/UL (ref 4.5–5.8)
RBC # BLD AUTO: 4.47 M/UL (ref 4.5–5.8)
SALICYLATES SERPL-MCNC: <1.5 MG/DL
SALICYLATES SERPL-MCNC: <1.5 MG/DL
SODIUM SERPL-SCNC: 140 MEQ/L (ref 136–145)
SODIUM SERPL-SCNC: 140 MEQ/L (ref 136–145)
WBC # BLD AUTO: 10.98 K/UL (ref 3.8–10.5)
WBC # BLD AUTO: 10.98 K/UL (ref 3.8–10.5)

## 2025-06-23 PROCEDURE — G0480 DRUG TEST DEF 1-7 CLASSES: HCPCS

## 2025-06-23 PROCEDURE — 97166 OT EVAL MOD COMPLEX 45 MIN: CPT | Mod: GO

## 2025-06-23 PROCEDURE — 36415 COLL VENOUS BLD VENIPUNCTURE: CPT

## 2025-06-23 PROCEDURE — 80307 DRUG TEST PRSMV CHEM ANLYZR: CPT

## 2025-06-23 PROCEDURE — 80053 COMPREHEN METABOLIC PANEL: CPT

## 2025-06-23 PROCEDURE — 74177 CT ABD & PELVIS W/CONTRAST: CPT

## 2025-06-23 PROCEDURE — 99285 EMERGENCY DEPT VISIT HI MDM: CPT | Mod: 25

## 2025-06-23 PROCEDURE — 99284 EMERGENCY DEPT VISIT MOD MDM: CPT | Mod: 25

## 2025-06-23 PROCEDURE — 63600105 HC IODINE BASED CONTRAST: Mod: JZ

## 2025-06-23 PROCEDURE — 85025 COMPLETE CBC W/AUTO DIFF WBC: CPT

## 2025-06-23 PROCEDURE — 63700000 HC SELF-ADMINISTRABLE DRUG: Performed by: EMERGENCY MEDICINE

## 2025-06-23 PROCEDURE — 97162 PT EVAL MOD COMPLEX 30 MIN: CPT | Mod: GP

## 2025-06-23 PROCEDURE — 71260 CT THORAX DX C+: CPT

## 2025-06-23 PROCEDURE — 70450 CT HEAD/BRAIN W/O DYE: CPT

## 2025-06-23 PROCEDURE — 72125 CT NECK SPINE W/O DYE: CPT

## 2025-06-23 RX ORDER — ACETAMINOPHEN 325 MG/1
975 TABLET ORAL EVERY 6 HOURS PRN
Status: DISCONTINUED | OUTPATIENT
Start: 2025-06-23 | End: 2025-06-24 | Stop reason: HOSPADM

## 2025-06-23 RX ORDER — LORAZEPAM 1 MG/1
1 TABLET ORAL ONCE
Status: COMPLETED | OUTPATIENT
Start: 2025-06-23 | End: 2025-06-23

## 2025-06-23 RX ORDER — IOPAMIDOL 755 MG/ML
100 INJECTION, SOLUTION INTRAVASCULAR
Status: COMPLETED | OUTPATIENT
Start: 2025-06-23 | End: 2025-06-23

## 2025-06-23 RX ADMIN — LORAZEPAM 1 MG: 1 TABLET ORAL at 08:57

## 2025-06-23 RX ADMIN — IOPAMIDOL 100 ML: 755 INJECTION, SOLUTION INTRAVENOUS at 03:31

## 2025-06-23 ASSESSMENT — ENCOUNTER SYMPTOMS
WOUND: 0
HEADACHES: 1
ARTHRALGIAS: 1
VOMITING: 0
ABDOMINAL PAIN: 1
LIGHT-HEADEDNESS: 0
FEVER: 0
BACK PAIN: 0
NECK STIFFNESS: 0
JOINT SWELLING: 0
WEAKNESS: 1
CHILLS: 0
DIZZINESS: 0
MYALGIAS: 0
NAUSEA: 0
SHORTNESS OF BREATH: 0
NECK PAIN: 0

## 2025-06-23 ASSESSMENT — COGNITIVE AND FUNCTIONAL STATUS - GENERAL
HELP NEEDED FOR PERSONAL GROOMING: 2 - A LOT
SPEECH: REGULAR
EYE_CONTACT: AVERTED
CLIMB 3 TO 5 STEPS WITH RAILING: 1 - TOTAL
AFFECT: CONFUSED
WALKING IN HOSPITAL ROOM: 1 - TOTAL
THOUGHT_CONTENT: CONFUSED
DRESSING REGULAR LOWER BODY CLOTHING: 1 - TOTAL
ORIENTATION: FULLY ORIENTED
ATTENTION: WNL
HELP NEEDED FOR BATHING: 2 - A LOT
SLEEP_WAKE_CYCLE: NO CHANGE
DELUSIONS: NONE OR AGE APPROPRIATE
PERCEPTUAL FUNCTION: PAIN
MOOD: FRUSTRATED;IRRITABLE
IMPULSE CONTROL: IMPAIRED, MINIMALLY
INSIGHT: IMPAIRED SEVERELY
CONCENTRATION: WNL
THOUGHT_PROCESS: CONFUSED
REMOTE MEMORY: WNL
RECENT MEMORY: WNL
JUDGEMENT: IMPAIRED, MODERATELY
STANDING UP FROM CHAIR USING ARMS: 1 - TOTAL
APPEARANCE: DISHEVELED
EATING MEALS: 2 - A LOT
APPETITE: NO CHANGE
AROUSAL LEVEL: AWAKE
MOVING TO AND FROM BED TO CHAIR: 1 - TOTAL
AFFECT: CONFUSED
TOILETING: 1 - TOTAL
AFFECT: FULL RANGE
LIBIDO: NON-CONTRIBUTORY
DRESSING REGULAR UPPER BODY CLOTHING: 2 - A LOT
PSYCHOMOTOR FUNCTIONING: WNL

## 2025-06-23 ASSESSMENT — VISUAL ACUITY: OU: 1
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
## 2025-06-23 NOTE — PROGRESS NOTES
Physical Therapy -  Initial Evaluation     Patient: Myles Yan  Location: [facility] Emergency Department ED23  MRN: [id number]  Today's date: 6/23/2025    HISTORY OF PRESENT ILLNESS     Myles is a 55 y.o. male admitted on 6/23/2025 with No admission diagnoses are documented for this encounter.. Principal problem is No Principal Problem: There is no principal problem currently on the Problem List. Please update the Problem List and refresh..    Past Medical History  Myles has no past medical history on file.    History of Present Illness  Patient is a 55-year-old male with a past medical history of mood disorder, conversion disorder, bilateral blindness, primary angle-closure glaucoma, left-sided weakness presenting to the emergency department today with altered mental status.      CT imaging without traumatic findings.  Urine drug screen is positive for PCP, amphetamines, fentanyl.  Ethanol levels negative.  Patient did not do well with ambulation.  Medicines not ordered given that patient is unsure which medication he is taking.  Patient ultimately wants either placement into a rehab for drug use versus rehab into a facility.  PT/OT/case management consults placed.  CRS consult placed.     PRIOR LEVEL OF FUNCTION AND LIVING ENVIRONMENT     Prior Level of Function      Flowsheet Row Most Recent Value   Dominant Hand right   Ambulation assistive equipment   Transferring assistive equipment   Toileting independent   Bathing independent   Dressing independent   Prior Level of Function Comment pt reports using walker vs. cane for mobility.   Assistive Device Currently Used at Home cane, straight, walker, front-wheeled        Prior Living Environment      Flowsheet Row Most Recent Value   People in Home other (see comments)   Current Living Arrangements homeless       VITALS AND PAIN     PT Vitals      Date/Time Pulse SpO2 BP BP Location BP Method Pt Position [facility]   06/23/25 1120 77 98 % 107/67 Right  "upper arm Automatic Lying ECM   06/23/25 1129 78 99 % 118/63 Right upper arm Automatic Sitting ECM               Objective     OBJECTIVE     Start time:  1120  End time:  1129  Session Length: 9 min  Mode of Treatment: co-treatment  Reason for co-treatment: priority per ED    General Observations  Patient received in bed. He was agreeable to therapy, no issues or concerns identified by nurse prior to session. resting in bed on arrival    Precautions: fall       Limitations/Impairments: safety/cognitive      PT Eval and Treat - 06/23/25 1120          Cognition    Orientation Status oriented to;person;place;verbal cues/prompts needed for orientation;time   July = month initially    Affect/Mental Status confused     Follows Commands follows one-step commands;repetition of directions required;verbal cues/prompting required     Cognitive Function attention deficit;executive function deficit     Attention Deficit focused/sustained attention;requires cues/redirection to task     Executive Function Deficit insight/awareness of deficits;problem-solving/reasoning     Comment, Cognition pt was initially answering questions but required repetition and redirection to task. had difficulty answering questions about sensation when asked. once sitting, pt asked therapists \"am I making sense?\" and then was noted to have slurred speech and difficulty stating name. emergency response called and medical team arrived to assess.        Vision Assessment/Intervention    Vision Assessment impaired     Visual Impairment/Limitations legally blind        Hearing Assessment    Hearing Status hearing impairment, bilaterally   mild       Sensory Assessment    Sensory Assessment unable/difficult to assess   pt with difficulty answering questions, later reporting numbness to L side?       Lower Extremity Strength    Hip, Left (Strength) 2-/5 adduction, initially able to advance hip toward R side of bed     Knee, Left (Strength) difficult to " "formally assess     Ankle, Left (Strength) difficult to formally assess     Hip, Right (Strength) 2/5, able to abduct to advance LE to EOB     Knee, Right (Strength) difficult to formally assess     Ankle, Right (Strength) difficult to formally assess        Bed Mobility    Bed Mobility Activities supine to sit;sit to supine;right     [place] maximum assist (25-49% patient effort);2 person assist     Safety/Cues technique;sequencing     Assistive Device head of bed elevated;bed rails     Comment pt was participating to advance LEs toward EOB. requires increased assist at trunk to achieve EOB sitting. able to maintain with CS. pt asked \"can you them to put a bandage on my back?\" once upright. while sitting, pt reported new L sided weakness and was noted to have nonsensical and slurred speech. pt required assist at trunk and LEs for safe return to bed. emergency response called and medical team arrived to assess.        Sit/Stand Transfer    [place] unable to assess        Gait Training    [place], Gait unable to assess        Balance    Static Sitting Balance WFL;sitting, edge of bed     Dynamic Sitting Balance mild impairment;sitting, edge of bed     Sit to Stand Dynamic Balance unable to perform activity     Static Standing Balance unable to perform activity     Dynamic Standing Balance unable to perform activity        Impairments/Safety Issues    Impairments Affecting Function balance;grasp;strength;postural/trunk control;cognition;range of motion (ROM);endurance/activity tolerance     Cognitive Impairments, Safety/Performance safety precaution awareness;safety precaution follow-through;insight into deficits/self-awareness;judgment                                              Education Documentation  Joint Mobility/Strength, taught by Tina Clark PT at 6/23/2025 11:51 AM.  Learner: Patient  Readiness: Acceptance  Method: Explanation  Response: Needs Reinforcement  Comment: role of " acute PT, PT POC, bed mobility        Session Outcome  Patient in bed at end of session,  (handoff to medical team / RN). Nursing notified about patient's performance, patient's position, and patient's response to therapy/activity.    AM-PAC™ - Mobility (Current Function)     Turning form your back to your side while in flat bed without using bedrails 2 - A Lot   Moving from lying on your back to sitting on the side of a flat bed without using bedrails 2 - A Lot   Moving to and from a bed to a chair 1 - Total   Standing up from a chair using your arms 1 - Total   To walk in a hospital room 1 - Total   Climbing 3-5 steps with a railing 1 - Total   AM-PAC™ Mobility Score 8          ASSESSMENT AND PLAN     Progress Summary  PT evaluation completed. pt required max Ax2 for supine <> sitting at EOB. maintained EOB sitting initially at CS level, however pt was noted to have slurred speech while upright and reported new L sided weakness (unable to  hand or flex elbow when asked). emergency response called and medical team arrived to assess. PT will follow as appropriate to progress mobility and improve function. [facility] 8/24.    Patient/Family Therapy Goals Statement: to feel better    PT Plan      Flowsheet Row Most Recent Value   Rehab Potential fair, will monitor progress closely at 06/23/2025 1120   Therapy Frequency 2 times/wk at 06/23/2025 1120   Planned Therapy Interventions balance training, bed mobility training, patient/family education, transfer training, gait training, strengthening at 06/23/2025 1120       PT Discharge Recommendations      Flowsheet Row Most Recent Value   PT Recommended Discharge Disposition skilled nursing facility at 06/23/2025 1120   Anticipated Equipment Needs if Discharged Home (PT) other (see comments)  [will continue to assess needs] at 06/23/2025 1120            PT Goals      Flowsheet Row Most Recent Value   Bed Mobility Goal 1    Activity/Assistive Device rolling to left, rolling  to right, sit to supine/supine to sit at 06/23/2025 1120   [place] supervision required at 06/23/2025 1120   Time Frame by discharge at 06/23/2025 1120   Progress/Outcome goal ongoing at 06/23/2025 1120   Transfer Goal 1    Activity/Assistive Device sit-to-stand/stand-to-sit, bed-to-chair/chair-to-bed  [LRAD] at 06/23/2025 1120   [place] supervision required at 06/23/2025 1120   Time Frame by discharge at 06/23/2025 1120   Gait Training Goal 1    Activity/Assistive Device gait (walking locomotion)  [LRAD] at 06/23/2025 1120   [place] supervision required at 06/23/2025 1120   Distance 50 at 06/23/2025 1120   Time Frame by discharge at 06/23/2025 1120   Progress/Outcome goal ongoing at 06/23/2025 1120
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
## 2025-06-23 NOTE — HOSPITAL COURSE
Myles is a 55 y.o. male admitted on 6/23/2025 with No admission diagnoses are documented for this encounter.. Principal problem is No Principal Problem: There is no principal problem currently on the Problem List. Please update the Problem List and refresh..    Past Medical History  Myles has no past medical history on file.    History of Present Illness  Patient is a 55-year-old male with a past medical history of mood disorder, conversion disorder, bilateral blindness, primary angle-closure glaucoma, left-sided weakness presenting to the emergency department today with altered mental status.      CT imaging without traumatic findings.  Urine drug screen is positive for PCP, amphetamines, fentanyl.  Ethanol levels negative.  Patient did not do well with ambulation.  Medicines not ordered given that patient is unsure which medication he is taking.  Patient ultimately wants either placement into a rehab for drug use versus rehab into a facility.  PT/OT/case management consults placed.  CRS consult placed.

## 2025-06-23 NOTE — CONSULTS
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  Resulted: 06/23/25 0328, Result status: In process     Ordering provider: Jacklyn Echevarria PA C  06/23/25 0157 Resulted by: Norma Sanchez MD   Performed: 06/23/25 0324 - 06/23/25 0328 Accession number: [id number]   Resulting lab: IMAGING          CT CERVICAL SPINE WITHOUT IV CONTRAST [[id number]]  Resulted: 06/23/25 0328, Result status: In process     Ordering provider: Jacklyn Echevarria PA C  06/23/25 0157 Resulted by: Norma Sanchez MD   Performed: 06/23/25 0324 - 06/23/25 0328 Accession number: [id number]   Resulting lab: IMAGING                  ECG Results (last 24 hours)    No matching results found       Microbiology Results       None          Home Medications    No medications on file.

## 2025-06-23 NOTE — PROGRESS NOTES
Occupational Therapy -  Initial Evaluation     Patient: Myles Yan  Location: [facility] Emergency Department ED23  MRN: [id number]  Today's date: 6/23/2025    HISTORY OF PRESENT ILLNESS     Myles is a 55 y.o. male admitted on 6/23/2025 with No admission diagnoses are documented for this encounter.. Principal problem is No Principal Problem: There is no principal problem currently on the Problem List. Please update the Problem List and refresh..    Past Medical History  Myles has no past medical history on file.    History of Present Illness  Patient is a 55-year-old male with a past medical history of mood disorder, conversion disorder, bilateral blindness, primary angle-closure glaucoma, left-sided weakness presenting to the emergency department today with altered mental status.      CT imaging without traumatic findings.  Urine drug screen is positive for PCP, amphetamines, fentanyl.  Ethanol levels negative.  Patient did not do well with ambulation.  Medicines not ordered given that patient is unsure which medication he is taking.  Patient ultimately wants either placement into a rehab for drug use versus rehab into a facility.  PT/OT/case management consults placed.  CRS consult placed.     PRIOR LEVEL OF FUNCTION AND LIVING ENVIRONMENT     Prior Level of Function      Flowsheet Row Most Recent Value   Dominant Hand right   Ambulation assistive equipment   Transferring assistive equipment   Toileting independent   Bathing independent   Dressing independent   Prior Level of Function Comment pt reports using walker vs. cane for mobility.   Assistive Device Currently Used at Home cane, straight, walker, front-wheeled        Prior Living Environment      Flowsheet Row Most Recent Value   People in Home other (see comments)   Current Living Arrangements homeless     Occupational Profile      Flowsheet Row Most Recent Value   Occupational History/Life Experiences per behavioral health follows w/  "[facility] in [place]   Performance Patterns homeless       VITALS AND PAIN     OT Vitals      Date/Time Pulse SpO2 BP BP Location BP Method Pt Position [facility]   06/23/25 1120 77 98 % 107/67 Right upper arm Automatic Lying ECM   06/23/25 1129 78 99 % 118/63 Right upper arm Automatic Sitting ECM               Objective     OBJECTIVE     Start time:  1119  End time:  1129  Session Length: 10 min  Mode of Treatment: co-treatment  Reason for co-treatment: priority per ED    General Observations  Patient received upright, in bed. He was no issues or concerns identified by nurse prior to session, agreeable to therapy. sidelying, awakens to voice    Precautions: fall (ambulate)       Limitations/Impairments: safety/cognitive, visual      OT Eval and Treat - 06/23/25 1119          Cognition    Orientation Status oriented to;person;disoriented to;place;time     Affect/Mental Status confused     Follows Commands follows one-step commands;50-74% accuracy;delayed response/completion;repetition of directions required;increased processing time needed     Cognitive Function attention deficit;executive function deficit;safety deficit     Attention Deficit moderate deficit;alternating attention;concentration;focused/sustained attention     Executive Function Deficit moderate deficit;information processing;initiation;self-monitoring/self-correction     Safety Deficit moderate deficit;awareness of need for assistance;safety precautions awareness     Comment, Cognition at start of eval awake and able to state name/birthday, not O to date and does not state place. speaking learly, at EOB after asking \"am I making sense\" in clear speech, slurring speech and nonsencial, not following commands -  emergency button utilized and medical team present        Vision Assessment/Intervention    Vision Assessment impaired     Visual Impairment/Limitations legally blind        Hearing Assessment    Hearing Status hearing impairment, " bilaterally        Sensory Assessment    Sensory Assessment sensation intact except     Sensation Impaired Location(s) left UE     Left UE Sensory Impairment general sensation   reports altered in LUE       Upper Extremity Assessment    UE Assessment ROM and Strength WFL except     General Observations observed reaching with LUE to assistin bed  mobility and then once at EOB with no active movement observed, emergency button utlized and medical team present        Bed Mobility    [place] maximum assist (25-49% patient effort);2 person assist     Assistive Device head of bed elevated     Comment assist at trunk and BLE once at EOB speech becoming nonsencial and slurred and LUE not activating emergency response button utilized to handoff to medical team        Mobility Belt    Mobility Belt Used During Session no - patient did not mobilize out of bed or stand        Sit/Stand Transfer    [place] unable to assess        Lower Body Dressing    [place] unable to assess     Comment see above        Balance    Static Sitting Balance WFL;sitting, edge of bed     Dynamic Sitting Balance mild impairment;sitting, edge of bed     Sit to Stand Dynamic Balance unable to perform activity     Static Standing Balance unable to perform activity        Impairments/Safety Issues    Impairments Affecting Function balance;grasp;strength;postural/trunk control;cognition;range of motion (ROM);endurance/activity tolerance     Cognitive Impairments, Safety/Performance safety precaution awareness;safety precaution follow-through;insight into deficits/self-awareness;judgment     Functional Endurance poor                                              Education Documentation  Rehabilitation Therapy, taught by Jacklyn Jimenez OT at 6/23/2025 11:43 AM.  Learner: Patient  Readiness: Acceptance  Method: Explanation  Response: Verbalizes Understanding, No Evidence of Learning  Comment: OT POC        Session Outcome  Patient in bed at  end of session, bed alarm on. Nursing notified about patient's performance and patient's position. (handoff to medical team bedside)    AM-PAC™ - ADL (Current Function)     Putting on/taking off regular lower body clothing 1 - Total   Bathing 2 - A Lot   Toileting 1 - Total   Putting on/taking off regular upper body clothing 2 - A Lot   Help for taking care of personal grooming 2 - A Lot   Eating meals 2 - A Lot   AM-PAC™ ADL Score 10          ASSESSMENT AND PLAN     Progress Summary  OT eval complete, limited by onset of nonsensical speech at EOB and impaired LUE motor reponse and emergency button called for handoff to medical team. Pt is MAX A x2 for supine <> sit transitions unable to progress to standing due to change in mental status and speech. At baseline pt indep w/ use of cane and walker. rec cont OT to incr indep w/ ADL and transfer    Patient/Family Therapy Goal Statement: get a bandaide put on his back    OT Plan      Flowsheet Row Most Recent Value   Rehab Potential fair, will monitor progress closely at 06/23/2025 1119   Therapy Frequency 2 times/wk at 06/23/2025 1119   Planned Therapy Interventions occupation/activity based interventions, transfer/mobility retraining, patient/caregiver education/training, activity tolerance training, adaptive equipment training, BADL retraining, ROM/therapeutic exercise, functional balance retraining at 06/23/2025 1119       OT Discharge Recommendations      Flowsheet Row Most Recent Value   OT Recommended Discharge Disposition skilled nursing facility at 06/23/2025 1119   Anticipated Equipment Needs if Discharged Home (OT) none at 06/23/2025 1119            OT Goals      Flowsheet Row Most Recent Value   Bed Mobility Goal 1    Activity/Assistive Device bed mobility activities, all at 06/23/2025 1119   [place] supervision required at 06/23/2025 1119   Time Frame by discharge at 06/23/2025 1119   Progress/Outcome goal ongoing at 06/23/2025 1119   Transfer Goal 1     Activity/Assistive Device sit-to-stand/stand-to-sit, bed-to-chair/chair-to-bed, toilet at 06/23/2025 1119   [place] supervision required at 06/23/2025 1119   Time Frame by discharge at 06/23/2025 1119   Progress/Outcome goal ongoing at 06/23/2025 1119   Dressing Goal 1    Activity/Adaptive Equipment upper body dressing at 06/23/2025 1119   [place] supervision required at 06/23/2025 1119   Time Frame by discharge at 06/23/2025 1119   Progress/Outcome goal ongoing at 06/23/2025 1119   Dressing Goal 2    Activity/Adaptive Equipment lower body dressing at 06/23/2025 1119   [place] supervision required at 06/23/2025 1119   Time Frame by discharge at 06/23/2025 1119   Progress/Outcome goal ongoing at 06/23/2025 1119   Toileting Goal 1    Activity/Assistive Device toileting skills, all at 06/23/2025 1119   [place] supervision required at 06/23/2025 1119   Time Frame by discharge at 06/23/2025 1119   Progress/Outcome goal ongoing at 06/23/2025 1119
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
## 2025-06-24 VITALS
RESPIRATION RATE: 18 BRPM | WEIGHT: 315 LBS | DIASTOLIC BLOOD PRESSURE: 55 MMHG | OXYGEN SATURATION: 94 % | OXYGEN SATURATION: 94 % | TEMPERATURE: 98.2 F | TEMPERATURE: 98.2 F | WEIGHT: 315 LBS | HEART RATE: 74 BPM | RESPIRATION RATE: 18 BRPM | SYSTOLIC BLOOD PRESSURE: 102 MMHG | HEART RATE: 74 BPM | SYSTOLIC BLOOD PRESSURE: 102 MMHG | DIASTOLIC BLOOD PRESSURE: 55 MMHG

## 2025-06-24 RX ORDER — FLUTICASONE PROPIONATE AND SALMETEROL 100; 50 UG/1; UG/1
1 POWDER RESPIRATORY (INHALATION)
COMMUNITY

## 2025-06-24 RX ORDER — TAMSULOSIN HYDROCHLORIDE 0.4 MG/1
1 CAPSULE ORAL DAILY
COMMUNITY

## 2025-06-24 RX ORDER — GABAPENTIN 300 MG/1
1 CAPSULE ORAL
COMMUNITY

## 2025-06-24 RX ORDER — SENNOSIDES 8.6 MG/1
1 TABLET ORAL DAILY
COMMUNITY

## 2025-06-24 RX ORDER — PANTOPRAZOLE SODIUM 40 MG/1
40 TABLET, DELAYED RELEASE ORAL DAILY
COMMUNITY

## 2025-06-24 RX ORDER — QUETIAPINE FUMARATE 50 MG/1
50 TABLET, FILM COATED ORAL NIGHTLY
COMMUNITY

## 2025-06-24 NOTE — DISCHARGE INSTRUCTIONS
This patient was seen in the emergency department for concerns for fall and difficulty walking patient was evaluated multiple times by different staff members and has had no acute complaints.    Patient did not provide any medications that he gave and we do not have an authorized medication reconciliation done for him..  He did not states the last that he took any daily medications.     If he states that he takes medications you can call his pharmacy to verify the medications and have the doctor at the facility write those medications.    We have included all labs and discharge results included with them
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
